# Patient Record
Sex: FEMALE | Race: WHITE | NOT HISPANIC OR LATINO | Employment: FULL TIME | URBAN - NONMETROPOLITAN AREA
[De-identification: names, ages, dates, MRNs, and addresses within clinical notes are randomized per-mention and may not be internally consistent; named-entity substitution may affect disease eponyms.]

---

## 2017-02-08 ENCOUNTER — HISTORY (OUTPATIENT)
Dept: FAMILY MEDICINE | Facility: OTHER | Age: 57
End: 2017-02-08

## 2017-02-08 ENCOUNTER — OFFICE VISIT - GICH (OUTPATIENT)
Dept: FAMILY MEDICINE | Facility: OTHER | Age: 57
End: 2017-02-08

## 2017-02-08 DIAGNOSIS — N30.00 ACUTE CYSTITIS WITHOUT HEMATURIA: ICD-10-CM

## 2017-02-08 DIAGNOSIS — Z12.39 ENCOUNTER FOR OTHER SCREENING FOR MALIGNANT NEOPLASM OF BREAST: ICD-10-CM

## 2017-02-08 DIAGNOSIS — Z23 ENCOUNTER FOR IMMUNIZATION: ICD-10-CM

## 2017-02-08 DIAGNOSIS — S39.011A STRAIN OF MUSCLE, FASCIA AND TENDON OF ABDOMEN, INITIAL ENCOUNTER: ICD-10-CM

## 2017-02-08 DIAGNOSIS — N95.2 POSTMENOPAUSAL ATROPHIC VAGINITIS: ICD-10-CM

## 2017-02-08 DIAGNOSIS — R35.0 FREQUENCY OF MICTURITION: ICD-10-CM

## 2017-02-08 DIAGNOSIS — R10.9 ABDOMINAL PAIN: ICD-10-CM

## 2017-02-08 LAB
BACTERIA URINE: ABNORMAL BACTERIA/HPF
BILIRUB UR QL: NEGATIVE
CLARITY, URINE: CLEAR CLARITY
COLOR UR: YELLOW COLOR
EPITHELIAL CELLS: ABNORMAL EPI/HPF
GLUCOSE URINE: NEGATIVE MG/DL
KETONES UR QL: NEGATIVE MG/DL
LEUKOCYTE ESTERASE URINE: ABNORMAL
NITRITE UR QL STRIP: POSITIVE
OCCULT BLOOD,URINE - HISTORICAL: ABNORMAL
PH UR: 6 [PH]
PROTEIN QUALITATIVE,URINE - HISTORICAL: 30 MG/DL
RBC - HISTORICAL: ABNORMAL /HPF
SP GR UR STRIP: 1.02
UROBILINOGEN,QUALITATIVE - HISTORICAL: NORMAL EU/DL
WBC - HISTORICAL: >100 /HPF

## 2017-02-15 ENCOUNTER — HISTORY (OUTPATIENT)
Dept: RADIOLOGY | Facility: OTHER | Age: 57
End: 2017-02-15

## 2017-02-15 ENCOUNTER — HOSPITAL ENCOUNTER (OUTPATIENT)
Dept: RADIOLOGY | Facility: OTHER | Age: 57
End: 2017-02-15
Attending: FAMILY MEDICINE

## 2017-02-15 DIAGNOSIS — Z12.39 ENCOUNTER FOR OTHER SCREENING FOR MALIGNANT NEOPLASM OF BREAST: ICD-10-CM

## 2017-03-06 ENCOUNTER — HISTORY (OUTPATIENT)
Dept: FAMILY MEDICINE | Facility: OTHER | Age: 57
End: 2017-03-06

## 2017-03-06 ENCOUNTER — OFFICE VISIT - GICH (OUTPATIENT)
Dept: FAMILY MEDICINE | Facility: OTHER | Age: 57
End: 2017-03-06

## 2017-03-06 DIAGNOSIS — Z00.00 ENCOUNTER FOR GENERAL ADULT MEDICAL EXAMINATION WITHOUT ABNORMAL FINDINGS: ICD-10-CM

## 2017-03-06 DIAGNOSIS — Z83.42 FAMILY HISTORY OF HYPERCHOLESTEROLEMIA: ICD-10-CM

## 2017-03-06 DIAGNOSIS — Z12.4 ENCOUNTER FOR SCREENING FOR MALIGNANT NEOPLASM OF CERVIX: ICD-10-CM

## 2017-03-06 DIAGNOSIS — L57.0 ACTINIC KERATOSIS: ICD-10-CM

## 2017-03-10 ENCOUNTER — COMMUNICATION - GICH (OUTPATIENT)
Dept: FAMILY MEDICINE | Facility: OTHER | Age: 57
End: 2017-03-10

## 2017-03-10 ENCOUNTER — AMBULATORY - GICH (OUTPATIENT)
Dept: FAMILY MEDICINE | Facility: OTHER | Age: 57
End: 2017-03-10

## 2017-03-10 ENCOUNTER — AMBULATORY - GICH (OUTPATIENT)
Dept: LAB | Facility: OTHER | Age: 57
End: 2017-03-10

## 2017-03-10 DIAGNOSIS — N95.2 POSTMENOPAUSAL ATROPHIC VAGINITIS: ICD-10-CM

## 2017-03-10 DIAGNOSIS — Z00.00 ENCOUNTER FOR GENERAL ADULT MEDICAL EXAMINATION WITHOUT ABNORMAL FINDINGS: ICD-10-CM

## 2017-03-10 DIAGNOSIS — Z83.42 FAMILY HISTORY OF HYPERCHOLESTEROLEMIA: ICD-10-CM

## 2017-03-10 LAB
A/G RATIO - HISTORICAL: 1.5 (ref 1–2)
ALBUMIN SERPL-MCNC: 4.3 G/DL (ref 3.5–5.7)
ALP SERPL-CCNC: 49 IU/L (ref 34–104)
ALT (SGPT) - HISTORICAL: 8 IU/L (ref 7–52)
ANION GAP - HISTORICAL: 8 (ref 5–18)
AST SERPL-CCNC: 18 IU/L (ref 13–39)
BILIRUB SERPL-MCNC: 1.9 MG/DL (ref 0.3–1)
BUN SERPL-MCNC: 18 MG/DL (ref 7–25)
BUN/CREAT RATIO - HISTORICAL: 24
CALCIUM SERPL-MCNC: 9.5 MG/DL (ref 8.6–10.3)
CHLORIDE SERPLBLD-SCNC: 103 MMOL/L (ref 98–107)
CHOL/HDL RATIO - HISTORICAL: 3.47
CHOLESTEROL TOTAL: 215 MG/DL
CO2 SERPL-SCNC: 27 MMOL/L (ref 21–31)
CREAT SERPL-MCNC: 0.75 MG/DL (ref 0.7–1.3)
GFR IF NOT AFRICAN AMERICAN - HISTORICAL: >60 ML/MIN/1.73M2
GLOBULIN - HISTORICAL: 2.9 G/DL (ref 2–3.7)
GLUCOSE SERPL-MCNC: 91 MG/DL (ref 70–105)
HDLC SERPL-MCNC: 62 MG/DL (ref 23–92)
LDLC SERPL CALC-MCNC: 141 MG/DL
NON-HDL CHOLESTEROL - HISTORICAL: 153 MG/DL
PATIENT STATUS - HISTORICAL: ABNORMAL
POTASSIUM SERPL-SCNC: 3.9 MMOL/L (ref 3.5–5.1)
PROT SERPL-MCNC: 7.2 G/DL (ref 6.4–8.9)
SODIUM SERPL-SCNC: 138 MMOL/L (ref 133–143)
TRIGL SERPL-MCNC: 60 MG/DL
TSH - HISTORICAL: 1.68 UIU/ML (ref 0.34–5.6)

## 2017-03-22 ENCOUNTER — AMBULATORY - GICH (OUTPATIENT)
Dept: FAMILY MEDICINE | Facility: OTHER | Age: 57
End: 2017-03-22

## 2017-11-02 ENCOUNTER — AMBULATORY - GICH (OUTPATIENT)
Dept: FAMILY MEDICINE | Facility: OTHER | Age: 57
End: 2017-11-02

## 2017-11-02 DIAGNOSIS — Z23 ENCOUNTER FOR IMMUNIZATION: ICD-10-CM

## 2018-01-03 NOTE — TELEPHONE ENCOUNTER
Patient Information     Patient Name MRN Sex Celio Jacobs [id number] Female 1960      Telephone Encounter by Lindy Hayes MD at 3/10/2017  2:42 PM     Author:  Lindy Hyaes MD Service:  (none) Author Type:  Physician     Filed:  3/10/2017  2:42 PM Encounter Date:  3/10/2017 Status:  Signed     :  Lindy Hayes MD (Physician)            TSH order placed.  Lindy Hayes MD ....................  3/10/2017   2:42 PM

## 2018-01-03 NOTE — PROGRESS NOTES
Patient Information     Patient Name MRN Sex Celio Gregorio [id number] Female 1960      Progress Notes by Erlinda Leonardo MD at 2017  8:30 AM     Author:  Erlinda Leonardo MD Service:  (none) Author Type:  Physician     Filed:  2017  7:08 PM Encounter Date:  2017 Status:  Signed     :  Erlinda Leonardo MD (Physician)            Nursing Notes:   Isabel Parish  2017  8:46 AM  Signed  Patient went skiing  and Monday and she fell. She is wondering if she pulled a muscle. She also has had frequent urination. She would like her flu shot today.    Isabel Parish LPN.......................... 2017  8:42 AM           Subjective:  Celio Yap is a 56 y.o. female who presents for abdominal pain / vaginal dryness/ pulled muscle.     Patient is a very pleasant 56-year-old white female she is a  in a monogamous relationship. She states that she is having increased frequency of urination. She denies any excessive thirst or urination. She declines STD screening is PELVIC exam though she does indicate that she is having pain suprapubically when she is urinating. She notes that she had recent intercourse over the weekend and symptoms started shortly after that. When asked if this is how her typical UTI start she indicates at times it does. She was also out skiing this weekend and fell when her dog stop suddenly. She landed in an unusual position straining her lower abdomen. She also has pain and discomfort in her quads. No back pain. She reports yesterday she had a hard time getting to the bathroom in time. She wasn't overtly incontinent. She denies any saddle numbness denies any incontinence of stool. No fevers or chills. She is complaining of vaginal dryness and wondering what can be done. In review of her chart she is overdue for mammogram and Pap.     She denies any upper respiratory infection symptoms.     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  Relatives in [place]. 2 grown children, 2 grandchildren                            Family Hx:   Family History       Problem   Relation Age of Onset     Other  Mother 72      of abdominal infection       Other  Father 25      in MVA       Cancer-breast  Maternal Aunt        Objective:  Visit Vitals       /80     Pulse 80     Ht 1.676 m (5' 6\")     Wt 65.8 kg (145 lb)     Breastfeeding No     BMI 23.4 kg/m2      General Appearance: Normal., Pleasant, alert, appropriate appearance for age. No acute distress,  Psych-alert, oriented, and appropriate affect  HEENT-within normal limits  Neck Exam: Normal., Supple, no masses or nodes.  Thyroid Exam: No nodules or enlargement., normal to palpation  Lungs:  Clear to auscultation.  Cardiovascular Exam: Regular rate and rhythm. S1, S2, no murmur, click, gallop, or rubs.  Breast Exam: declined   Gastrointestinal Exam: Soft, tender over bladder no abnormal masses or organomegaly.  MSK:  She has full range of motion of her back with flexion and extension. Negative straight leg lifts bilaterally. She has good internal and external rotation of her hips  Genitourinary Exam Female:offered  Patient declined.    Skin: no concerning moles, rashes, or lesions  Extremities:  NT, no edema        Results for orders placed or performed in visit on 17      URINALYSIS W REFLEX MICROSCOPIC IF POSITIVE      Result  Value Ref Range    COLOR                     Yellow Yellow Color    CLARITY                   Clear Clear Clarity    SPECIFIC " GRAVITY,URINE    1.020 1.010, 1.015, 1.020, 1.025                    PH,URINE                  6.0 6.0, 7.0, 8.0, 5.5, 6.5, 7.5, 8.5                    UROBILINOGEN,QUALITATIVE  Normal Normal EU/dl    PROTEIN, URINE 30 (A) Negative mg/dL    GLUCOSE, URINE Negative Negative mg/dL    KETONES,URINE             Negative Negative mg/dL    BILIRUBIN,URINE           Negative Negative                    OCCULT BLOOD,URINE        Large (A) Negative                    NITRITE                   Positive (A) Negative                    LEUKOCYTE ESTERASE        Small (A) Negative                   URINALYSIS MICROSCOPIC      Result  Value Ref Range    RBC 11-25 (A) 0-2, None Seen /HPF    WBC >100 (A) 0-2, 3-5, None Seen /HPF    BACTERIA                  Many (A) None Seen, Rare, Occasional, Few Bacteria/HPF    EPITHELIAL CELLS          Few None Seen, Few Epi/HPF         Assessment:    ICD-10-CM    1. Urinary frequency R35.0 URINALYSIS W REFLEX MICROSCOPIC IF POSITIVE      URINALYSIS W REFLEX MICROSCOPIC IF POSITIVE      URINALYSIS MICROSCOPIC      URINALYSIS MICROSCOPIC      phenazopyridine (PYRIDIUM) 200 mg tablet   2. Abdominal pain, unspecified location R10.9    3. Acute cystitis without hematuria N30.00 FLU VACCINE => 3 PRESERV FREE QUADRIVALENT IIV4 IM      trimethoprim-sulfamethoxazole, 160-800 mg, (BACTRIM DS, SEPTRA DS) tablet   4. Vaginal atrophy N95.2 estradiol (ESTRACE) 0.1 mg/g vaginal cream   5. Breast cancer screening Z12.39 XR MAMMO BILAT SCREENING   6. Need for influenza vaccination Z23 IA ADMIN VACC INITIAL   7. Abdominal muscle strain, initial encounter S39.011A         Reviewed at length use of hormone cream. Patient was instructed on using it every night for the first 10 days then no more than twice a week. Explained to patient increase use of unopposed estrogen can result in endometrial cancer. Any bleeding would prompt discontinuation of the hormone cream as well as endometrial biopsy.  She is able to  verbalize understanding and wishes to proceed.    Discuss UTI symptoms. Reviewed differences between cystitis versus ascending UTI versus kidney infection. Reviewed importance of voiding after intercourse. Urine culture pending    Discussed stretching exercises, use of topical capsaicin like medications. Ice. Offered muscle relaxant patient declined       I spent approximately  30 minutes with the patient (exclusive of separately billed services/procedures), with greater than 50% spent in counseling, prognosis, risks and benefits of management or follow-up.  Reviewed importance of compliance with chosen treatment options and follow-up.  Risk factor reduction and patient education and coordinating care, establishing and/or reviewing the patient's medical record also completed during today's exam .      Plan:   -- Expected clinical course discussed   -- Medications and their side effects discussed  Patient Instructions     1.   Take bactrim DS for your UTI take twice a day at least 7 days.     2. Estrace vaginal cream.   Use 1/2 to 1 applicator every night , only for the first 10 days; then no more than twice a week.     3.  Oven/ microwave    4.  aspercream with lidocaine/ salonpas for muscle aches.     5.  Hydrate !      6.  Follow up worsening or persistent symptoms    7.  Rethink flu shot;  Can obtain at any local pharmacy    8. Mammogram ordered.     Immunization History     Administered  Date(s) Administered     AMB Influenza, IIV3 (Age >=3 years)(Flu Clinic Only) 11/12/2012, 10/23/2013     Influenza, IIV4 (Age >= 3 Years) 11/05/2014     Tdap 04/04/2013          Index [place]   Bladder Infection: Brief Version   ________________________________________________________________________  KEY POINTS    The bladder is the part of your body that stores urine. When bacteria get into the bladder, it can get infected.    Your provider will give you antibiotics to treat the infection.    Drink lots of water and take your  medicine for as long as your healthcare provider prescribes, even when you feel better.  ________________________________________________________________________  What is a bladder infection?   The bladder is the part of your body that stores urine. When bacteria get into the bladder, it can get infected.  What is the cause?  A bladder infection happens when bacteria from the skin get into the bladder.    Bacteria can spread to the bladder from the rectal area or vagina.    Sometimes the bladder gets infected when something is blocking the flow of urine. For example, in men the problem can be caused by an enlarged prostate gland. In pregnant women pressure from the baby might cause the problem.  Women get bladder infections more often than men.  What are the symptoms?     You may feel the need to urinate a lot.    You may feel a burning or stinging when you urinate.    You may have cramps in your lower belly or back.    Your urine may be cloudy and smell bad.    Your urine may look pink or red.    You may have a fever or chills.  How is it treated?   If tests by your healthcare provider show that you have a bladder infection, your provider will prescribe antibiotics. You may also need pain medicine.  How can I take care of myself?     Take the antibiotic medicine for as long as your healthcare provider prescribes, even when you feel better.    Drink more water than you usually do.    Make sure you know when you should come back for a checkup.    Call your provider if your symptoms aren t better in 2 days, or if you have worse fever or pain.  How can I help prevent bladder infection?   Urinate often during the day. You should also urinate after you have sex.  If you are a woman, it is important to:     Keep the area around your vagina clean.    Wipe from front to back after you go to the bathroom.    Gently wash the area around your vagina when you bathe or shower.    Wear cotton underwear and use pantyhose with  cotton crotches.    Avoid tight clothing. Wear loose pants.    Take wet bathing suits off right away.  Talk to your healthcare provider if you have bladder infections often. You may need tests to find out why. Your provider may prescribe medicine that helps prevent bladder infections.  If you are a man, remember to:    Always wash your penis when you bathe or shower. If you are not circumcised, gently pull back the foreskin and wash the tip of the penis when you bathe.  Developed by [employer].  Adult Advisor 2016.3 published by [employer].  Last modified: 2015-04-29  Last reviewed: 2015-04-28  This content is reviewed periodically and is subject to change as new health information becomes available. The information is intended to inform and educate and is not a replacement for medical evaluation, advice, diagnosis or treatment by a healthcare professional.  References   Adult Advisor 2016.3 Index    Copyright   2016 [employer], a division of [employer] All rights reserved.          Index Related topics   Atrophic Vaginitis   ________________________________________________________________________  KEY POINTS    Atrophic vaginitis is thinning, dryness, and irritation of the vagina. It may happen when your body produces less estrogen.    If you are not pregnant or breast feeding, your healthcare provider may recommend that you take estrogen to replace the hormone your body is no longer making.    If you have been through menopause and have any vaginal bleeding, tell your healthcare provider.  ________________________________________________________________________  What is atrophic vaginitis?  Atrophic vaginitis is thinning, dryness, and irritation of the vagina. When you have less of the hormone estrogen in your body, the tissues in and around your vagina get thinner and make less moisture. This is most common after menopause.  What is the cause?  The ovaries produce the hormone estrogen as long  as you have menstrual periods. Atrophic vaginitis may happen when your body produces less estrogen, such as:    During or after menopause    While you are breast-feeding    After removal of both ovaries    After exposure of your pelvis or belly to X-rays for cancer treatment    After chemotherapy  What are the symptoms?  Symptoms may include:    Vaginal irritation, pain, or bleeding during sex    Vaginal dryness    Itching or burning of the area around the vagina    Burning or pain after you urinate    Leakage of urine    Vaginal discharge, often gray-colored with a bad odor, possibly blood-streaked  How is it diagnosed?  Your healthcare provider will ask about your symptoms and medical history and examine you. You will have a pelvic exam to check your vaginal tissue. You may have tests of vaginal discharge, urine, and blood.  How is it treated?  If you are not pregnant or breast feeding, your healthcare provider may recommend that you take estrogen to replace the hormone your body is no longer making. This treatment is called estrogen therapy, hormone replacement therapy (HRT), or menopausal hormone therapy (MHT). Discuss the risks and benefits of hormone therapy with your healthcare provider. Hormone therapy may increase your risk for heart disease. It may also increase your risk for stroke, breast cancer, blood clots, gallbladder problems, and possibly dementia. If you still have your uterus and choose to take hormones, you will need to take progesterone with the estrogen. Taking estrogen alone may increase your risk of cancer of the uterus.  Estrogen may be taken in many different forms, such as:    Tablets to be swallowed    Patches or lotion to be put on the skin    A cream, ring, or tablet put into the vagina    Pellets placed under the skin    Shots  If you are going to take hormone therapy, ask your healthcare provider about:    The different types and dosages of hormone therapy    Any side effects or  special precautions you should know about    When you should start and stop taking the hormones  Without hormone therapy, you may keep having the symptoms. If you use a hormone cream, you may have more normal vaginal moisture and secretions in about 2 to 4 weeks. With other forms of hormone therapy, such as pills, it may take 4 to 8 weeks.  How can I take care of myself and prevent the symptoms of atrophic vaginitis?   To help reduce or prevent symptoms of atrophic vaginitis:    Use a lubricant if you have mild pain during sex. Glycerin or water-based vaginal lubricants, such as K-Y jelly, can help lessen pain during sex. (Petroleum jelly, like Vaseline, is not recommended.) Regular sexual activity, including masturbation or intercourse, can also help keep the vaginal tissue healthy.    Avoid chemical irritants such as douches, sprays, and bubble bath.    When you bathe, use a mild soap in the vaginal area.    Wear loose-fitting, all-cotton underwear or cotton-crotch underwear. Change your underwear every day. Don t wear underwear when you sleep at night. This will help lessen your chances of getting vaginal infections, which could cause more discomfort.    If you have itching, avoid wearing pantyhose until the itching stops.    If you are still having periods, use unscented sanitary pads instead of tampons.    Don t use spermicidal foams, gels, or creams if they irritate your vagina. However, if you have not stopped having periods, make sure you practice good birth control if you want to prevent pregnancy.    If you have been through menopause and have any vaginal bleeding, tell your healthcare provider.  Developed by [employer].  Adult Advisor 2016.3 published by [employer].  Last modified: 2016-05-25  Last reviewed: 2014-12-11  This content is reviewed periodically and is subject to change as new health information becomes available. The information is intended to inform and educate and is not a replacement for  medical evaluation, advice, diagnosis or treatment by a healthcare professional.  References   Adult Advisor 2016.3 Index    Copyright   2016 [employer], a division of [employer] All rights reserved.            Index [place]   Estradiol, Vaginal   [id number]  ________________________________________________________________________  KEY POINTS    This medicine is put into the vagina to relieve the symptoms of menopause. Use it exactly as directed.    You should use hormones only when you need them, at the lowest effective dosage, and only for as long as you need them. You need regular checkups and exams while taking this medicine. Keep all appointments.    This medicine increases the risk of heart disease, stroke, breast cancer, endometrial cancer, blood clots in the lungs and legs, gallbladder disease, and possibly dementia. It may cause other unwanted side effects. Tell your healthcare provider if you have any side effects that are serious, continue, or get worse.    Tell all healthcare providers who treat you about all the prescription medicines, nonprescription medicines, supplements, natural remedies, and vitamins that you take.  ________________________________________________________________________  What are other names for this medicine?   Type of medicine: hormone  Generic and brand names: estradiol, vaginal; Estrace Vaginal Cream  What is this medicine used for?  This medicine is inserted in the vagina to treat dryness, itching, and burning, in and around the vagina due to menopause (when a woman no longer has regular monthly periods).  This medicine may be used to treat other conditions as determined by your healthcare provider.  What should my healthcare provider know before I take this medicine?   Tell your healthcare provider if you have ever had:    An allergic reaction to any hormones or medicines    A bleeding disorder    A hysterectomy (removal of the uterus)    A stroke or  transient ischemic attack (TIA)    Asthma    Blood clots in your legs, lungs, brain, or eyes    Cancer of the breast, uterus, cervix, or vagina    Depression    Diabetes    Endometriosis    Heart disease or a heart attack    Hereditary angioedema    High blood pressure    High cholesterol    Liver, gallbladder, or kidney disease    Lupus    Migraines or headaches along with vomiting, double vision, unsteadiness, weakness, or personality changes    Porphyria (nerve pain or sensitivity to sunlight)    Problems with calcium levels in the blood    Seizures    Unexplained vaginal bleeding    Yellowing of the eyes or skin (jaundice) during pregnancy or during past use of hormonal birth control  Tell your healthcare provider if you have recently had a long period of bed rest after major surgery or a broken bone in a cast.  Tell your provider if you have a family history of heart disease, heart attack, blood clots, strokes, breast cancer, or other conditions.  Tell your healthcare provider if you smoke. Smoking while you are using this medicine increases the risk of serious side effects such as heart attack, stroke, and blood clots. The risk increases with age and the number of cigarettes smoked a day. Talk to your healthcare provider about ways to quit smoking.  Females of childbearing age: Do not take this medicine if you are pregnant. This medicine has been reported to cause birth defects. Stop taking this medicine at the first sign that you may be pregnant and contact your healthcare provider right away. Do not breast-feed while taking this medicine without your healthcare provider's approval.  How do I use it?   Check the label on the medicine for directions about your specific dose. Read the information sheet that comes in the medicine package. Follow these directions carefully. If you do not understand how to use this medicine, ask your healthcare provider or pharmacist to explain.  Use this medicine exactly as your  healthcare provider prescribes. Do not use more of it or use it longer than prescribed. If you do not understand how to use this medicine, ask your healthcare provider or pharmacist to explain.  This medicine is intended for use only after menopause. Check with your healthcare provider before using this medicine in children under age 18.  Wash your hands before and after using this medicine. The medicine is in a tube. Take the cap off the tube and screw the applicator onto the tube. Squeeze the medicine into the applicator slowly until the applicator reaches the dose measurement that your provider prescribed. Remove the applicator from the tube. Put the cap back on the tube.  Use the applicator to put the correct amount of medicine into your vagina. Lie on your back with your knees bent. Hold the full applicator in one hand. Put the applicator into your vagina (like you would a tampon) as far as you can. Slowly press the plunger until it stops. Withdraw the applicator. The medicine will be left behind in the vagina.  Wash the applicator with soap and lukewarm water after every use. Rinse and dry it well.  While using this medicine, wear a sanitary napkin to protect your clothing.  What if I miss a dose?  If you are not sure of what to do if you miss a dose, contact your healthcare provider.  What if I overdose?  An acute overdose of this medicine is not likely to cause life-threatening symptoms. If you think that you or anyone else may have taken too much of this medicine, call the poison control center at [phone].  What should I watch out for?   Hormone therapy, also known as hormone replacement therapy (HRT) offers both benefits and risks. Estrogen has previously been prescribed to help prevent bone loss (osteoporosis). This is no longer advised because it may be harmful. Estrogen also increases the risk of stroke, blood clots in the lungs and legs, heart disease, breast cancer, and possibly dementia. Also,  estrogen taken without progestin increases the risk of uterine cancer if you still have your uterus.  Most experts now recommend short-term use (up to 3 or 4 years) if hormone therapy is needed to relieve menopausal symptoms and prevent bone loss. You should take hormones only when you need them, at the lowest effective dosage, and only for as long as you need them. The risk of medical problems such as stroke or cancer increases further with higher doses and longer treatments. Talk with your healthcare provider about this.  You need to see your provider regularly (every 6 months) for checkups to find out if this medicine is still needed and if you are having any side effects. Keep all your appointments. Do not take this medicine for longer than 1 year without a complete physical exam.  You should also have a breast exam and mammogram every year or as often as your healthcare provider advises.  If you need emergency care, surgery, lab tests, or dental work, tell the healthcare provider or dentist you are using this medicine. You may need to stop taking hormones at least 4 to 6 weeks before the surgery and bedrest.  Tell healthcare providers you are taking this medicine before you have lab tests. This medicine may change blood test results such as cholesterol, liver function, and thyroid levels.  Sometimes this medicine causes dark spots to develop on your skin, especially with long exposure to the sun. The spots usually go away when you stop using this medicine. While you are taking this medicine, avoid long exposure to the sun. Wear protective clothing, a hat, and sunscreen lotion when you need to be outdoors. Do not use a sunlamp.  This medicine may weaken condoms, diaphragms, and cervical caps. Talk with your healthcare provider about this.  If you wear contact lenses and notice a change in your vision or it becomes difficult to wear your lenses, contact your healthcare provider.  This medicine may increase your  HDL cholesterol levels, decrease your LDL levels, and increase your blood triglyceride levels. Talk to your healthcare provider about this.  If you have diabetes: This medicine may affect your blood sugar level and change the amount of insulin or other diabetes medicines you may need. Talk to your healthcare provider about this.  What are the possible side effects?   Along with its needed effects, your medicine may cause some unwanted side effects. Some side effects may be very serious. Some side effects may go away as your body adjusts to the medicine. Tell your healthcare provider if you have any side effects that continue or get worse.  Life-threatening (Report these to your healthcare provider right away. If you are unable to reach your healthcare provider right away, get emergency medical care or call 911 for help.): Allergic reaction (hives; itching; rash; trouble breathing; swelling of your lips, tongue and throat); sudden weakness, numbness, or tingling, especially on one side of your body; sudden or severe headache; sudden trouble with vision, speech, balance, or walking; crushing chest pain or chest heaviness; coughing up blood; sudden shortness of breath; swelling.  Serious (Report these to your healthcare provider right away.): Unexplained bleeding from your vagina; dizziness, or fainting; breast lumps; redness, warmth, or swelling in your hands, ankles or feet; increased blood pressure; discomfort from contact lenses, vision changes; yellowing of the skin or eyes, especially with fever, tiredness, loss of appetite, dark urine, light-colored bowel movements.  Other: Changes in menstruation, breast tenderness, nausea, vomiting, acne, hair loss, cramping, minor bloating, depression, anxiety, weight gain or loss, headache, sensitivity to the sun; darkening of skin on the face; vaginal itch.  What products might interact with this medicine?   When you take this medicine with other medicines, it can change  the way this or any of the other medicines work. Nonprescription medicines, vitamins, natural remedies, and certain foods may also interact. Using these products together might cause harmful side effects. Talk to your healthcare provider if you are taking:    Antibiotics such as ciprofloxacin (Cipro), clarithromycin (Biaxin), erythromycin (E.E.S., Dany-Tab, Erythrocin), rifabutin (Mycobutin), rifampin (Rifadin), and rifapentine (Priftin)    Antidepressants such as amitriptyline, clomipramine, imipramine (Tofranil), and nortriptyline (Pamelor)    Antifungal medicines such as fluconazole (Diflucan), itraconazole (Sporanox), ketoconazole (Nizoral), posaconazole (Noxafil), and voriconazole (Vfend)    Antiseizure medicines such as carbamazepine (Carbatrol, Epitol, Equetro, Tegretol), ethotoin (Peganone), felbamate (Felbatol), fosphenytoin (Cerebyx), gabapentin (Neurontin), lamotrigine (Lamictal), levetiracetam (Keppra), oxcarbazepine (Trileptal), phenytoin (Dilantin, Phenytek), primidone (Mysoline), tiagabine (Gabitril), topiramate (Qudexy, Topamax, Trokendi), and valproic acid (Depacon, Depakene, Depakote)    Aprepitant (Emend) and fosaprepitant (Emend)    Barbiturates such as butabarbital (Butisol), pentobarbital (Nembutal), phenobarbital, and secobarbital (Seconal)    Bosentan (Tracleer)    Bupropion (Aplenzin, Forfivo, Wellbutrin, Buproban, Zyban)    Calcium channel blockers such as diltiazem (Cardizem, Cartia, Tiazac) and verapamil (Calan, Covera, Verelan)    Cancer medicines such as anastrozole (Arimidex), exemestane (Aromasin), letrozole (Femara), and tamoxifen    Cimetidine (Tagamet)    Corticosteroids such as betamethasone, cortisone, dexamethasone, fludrocortisone, hydrocortisone (A-Hydrocort, Cortef), methylprednisolone (Medrol, Solu-Medrol), prednisolone (Omnipred, Orapred, Prelone), prednisone (Prednisone Intensol), and triamcinolone (Aristospan, Kenalog)    Cyclosporine (Gengraf, Neoral,  Sandimmune)    Dantrolene (Dantrium)    Diabetes medicines such as acarbose (Precose), glipizide (Glucotrol), glyburide (DiaBeta, Glynase), insulin, metformin (Fortamet, Glucophage, Riomet), miglitol (Glyset), nateglinide (Starlix), pioglitazone (Actos), repaglinide (Prandin), and rosiglitazone (Avandia)    Doxepin (Silenor)    HIV medicines such as atazanavir (Reyataz), darunavir (Prezista), delavirdine (Rescriptor), efavirenz (Sustiva), etravirine (Intelence), fosamprenavir (Lexiva), indinavir (Crixivan), lopinavir/ritonavir (Kaletra), nelfinavir (Viracept), nevirapine (Viramune), ritonavir (Norvir), saquinavir (Invirase), stavudine (Zerit), and tipranavir (Aptivus)    Isoniazid    Isotretinoin (Absorica, Amnesteem, Claravis, Myorisan, Zenatane)    Methotrexate (Otrexup, Rasuvo, Rheumatrex, Trexall)    Natural remedies such as alfalfa, black cohosh, bloodroot, chasteberry, dong quai, evening primrose oil, ginseng, licorice, red clover, Ardencroft's wort, saw palmetto, soy, topical progesterone, and wild yam    Raloxifene (Evista)    Theophylline    Thyroid medicines such as levothyroxine (Levo-T, Levothroid, Levoxyl, Synthroid, Unithroid), liothyronine (Cytomel, Triostat), liotrix (Thyrolar), and thyroid USP ([place] Thyroid, Nature-Throid)    Tizanidine (Zanaflex)    Warfarin (Coumadin)  If you are not sure if your medicines might interact, ask your pharmacist or healthcare provider. Keep a list of all your medicines with you. List all the prescription medicines, nonprescription medicines, supplements, natural remedies, and vitamins that you take. Be sure that you tell all healthcare providers who treat you about all the products you are taking.   How should I store this medicine?   Store this medicine at room temperature. Keep the container tightly closed. Protect it from heat, high humidity, and bright light.    This advisory includes selected information only and may not include all side effects of this medicine  or interactions with other medicines. Ask your healthcare provider or pharmacist for more information or if you have any questions.  Ask your pharmacist for the best way to dispose of outdated medicine or medicine you have not used. Do not throw medicine in the trash.  Keep all medicines out of the reach of children.   Do not share medicines with other people.   Developed by [employer].  Medication Advisor 2016.3 published by [employer].  Last modified: 2016-03-23  Last reviewed: 2015-06-30  This content is reviewed periodically and is subject to change as new health information becomes available. The information is intended to inform and educate and is not a replacement for medical evaluation, advice, diagnosis or treatment by a healthcare professional.  References   Medication Advisor 2016.3 Index    Copyright   2016 [employer], a division of [employer] All rights reserved.         Shoutly: Have You Had Your Mammogram?     Mammograms  Why are mammograms important?            Mammograms can help women detect breast cancer early.            A mammogram may find the smallest cancers before a lump can be felt.            Early detection means more choices for treatment if you do have cancer.  Reasons Women Give for Avoiding a Mammogram  Do you or someone you know say the following about mammograms?   There s no breast cancer in my family.    I don t have time.    It might hurt.   There are many reasons why women say they do not get regular mammograms. The following are some of their reasons along with some important information for you to know.             I can t afford a mammogram.   Most insurance companies cover the cost of the screening (you may have a copay).     Check with your insurance provider to see if the mammogram(s) will be covered. Your health care provider s recommendation for services does not guarantee coverage by your insurance provider.    Medicare now covers yearly  mammograms. Free and low-cost mammograms are available for women with little or no insurance. Call the American Cancer Society at 1-718.352.2660 for more information about these programs.               No one in my family has ever had breast cancer. I don t need screening.   Being a woman and getting older are the two greatest risk factors for getting breast cancer. The majority of all breast cancers occur in women with no other risk factors.    Breast cancer risk is increased if other close members of your family (mother or sister) had breast cancer.    You have a slightly increased risk of breast cancer if you:  -       had your first period before age 12  -       have never given birth  -       gave birth to your first child after age 35  -       started menopause after age 55.  Obesity is linked to an increased risk of breast cancer.     Please talk with your health care provider if you have any questions or concerns about these risk factors.                My health care provider never told me to get a mammogram.   If your health care provider hasn t told you about the need for a mammogram, ask him or her.                I don t have time to get a mammogram.   The entire test usually takes less than 30 minutes. It s important to take the time to care for yourself and your health.                I heard it hurts. I don t want to go through that.   You may feel some brief discomfort, but it lasts less than 30 seconds. A technologist will position your breast and compress it to get a good  picture.      If you have sensitive breasts, try having your mammogram at a time of the month when your breasts will be least tender. For example, avoid scheduling a mammogram the week before your period. This will help lessen discomfort.                I m better off not knowing.   More than 80 percent of breast lumps are not cancerous. You can gain peace of mind from taking control of your health and having regular  mammograms.     You Can See the Difference a Mammogram Makes    Regular mammograms are your best way to find tumors at their smallest when treatment can be the most successful.  Breast Cancer Screening Schedule  When to start having mammograms to screen for breast cancer, and how often to have them, is a personal decision. It should be based on your preferences, your values and your risk for developing breast cancer. [employer] recommends that you and your health care provider together determine when mammograms are right for you.  [employer] recommends the following guidelines for women who have an average risk for breast cancer, based on American Cancer Society guidelines:            Age 40 to 44: Mammograms are optional.             Age 45 to 54: Have a mammogram every year.            Age 55 and older: Have a mammogram every year, or transition to having one every 2 years. Continue to have mammograms as long as your health is good.  If you have a  higher than average risk for breast cancer, your health care provider may recommend a different schedule.  Talk with your health care provider about your risk level. Together, you and your health care provider can decide what screening schedule is right for you.   2016 [employer]. TM - A TRADEMARK OF [employer]  OTHER TRADEMARKS USED ARE OWNED BY THEIR RESPECTIVE OWNERS  THIS FACT SHEET DOES NOT REPLACE MEDICAL OR PROFESSIONAL ADVICE; IT IS ONLY A GUIDE  [id number] (12/16)        Electronically signed by Erlinda Leonardo MD

## 2018-01-03 NOTE — TELEPHONE ENCOUNTER
Patient Information     Patient Name MRN Sex Celio Jacobs [id number] Female 1960      Telephone Encounter by Jackie Madsen at 3/10/2017 12:45 PM     Author:  Jackie Madsen Service:  (none) Author Type:  (none)     Filed:  3/10/2017 12:46 PM Encounter Date:  3/10/2017 Status:  Signed     :  Jackie Madsen            Patient was in for lab appointment today. She thought she was supposed to have a TSH done with the CMP, and Lipid already ordered. If so please place an order for TSH and we can add it on.

## 2018-01-03 NOTE — NURSING NOTE
Patient Information     Patient Name MRN Celio Adan [id number] Female 1960      Nursing Note by Rika Her at 3/6/2017  2:15 PM     Author:  Rika Her Service:  (none) Author Type:  (none)     Filed:  3/6/2017  2:48 PM Encounter Date:  3/6/2017 Status:  Signed     :  Rika Her            Patient is here for annual physical, has a couple skin questions, and concerns of chest pain. Pain was Saturday, was riding in a car, had jaw pain, shoulder and neck pain at time.  Rika Her LPN .............3/6/2017  2:23 PM

## 2018-01-03 NOTE — PROGRESS NOTES
Patient Information     Patient Name MRN Sex     Celio Yap [id number] Female 1960      Progress Notes by Sasha Mcconnell MD at 3/6/2017  2:15 PM     Author:  Sasha Mcconnell MD Service:  (none) Author Type:  Physician     Filed:  3/9/2017  8:50 AM Encounter Date:  3/6/2017 Status:  Signed     :  Sasha Mcconnell MD (Physician)            ANNUAL EXAM ADULT FEMALE - GYN    Celio Yap is a 56 y.o. female, G 2, P 2, here today for her annual gynecologic exam.  HPI:  Presents for annual exam. She has a few skin lesion she would like me to look at.  She is struggling with vaginal dryness, it is limiting her ability to enjoy intercourse, decreased libido since going through menopause.No abnormal pap smears, no bleeding      CURRENT MEDICATIONS:  Current Outpatient Prescriptions       Medication  Sig Dispense Refill     estradiol (ESTRACE) 0.1 mg/g vaginal cream 1/2-1 applicator every night for first ten nights then no more than twice a week. 1 Tube 0     No current facility-administered medications for this visit.      Medications have been reviewed by me and are current to the best of my knowledge and ability.      ALLERGIES:  Review of patient's allergies indicates no known allergies.     Past Medical History      Diagnosis   Date     Hx of pregnancy       ,  X2      Postmenopausal         Past Surgical History       Procedure   Laterality Date     Endometrial ablation         Endometrial ablation       Vein stripping        Varicose vein laser procedures       Xr breast stereotactic wire local (ia)        Lumbar diskectomy          SOCIAL HISTORY:  Social History     Social History        Marital status:       Spouse name: N/A     Number of children:  N/A     Years of education:  N/A     Occupational History      Not on file.     Social History Main Topics         Smoking status:   Never Smoker     Smokeless tobacco:   Never Used     Alcohol use   0.0  "oz/week     0 Standard drinks or equivalent per week        Comment: one glass of wine per lucila      Drug use:   No     Sexual activity:   Yes     Other Topics  Concern      Service Yes     Blood Transfusions No     Caffeine Concern No     Occupational Exposure No     Hobby Hazards No     Sleep Concern No     Stress Concern No     Weight Concern No     Special Diet No     Back Care No     Exercise No     Bike Helmet No     Seat Belt Yes     Self-Exams Yes     Social History Narrative     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  Relatives in [place]. 2 grown children, 2 grandchildren                           Family History       Problem   Relation Age of Onset     Other  Mother 72      of abdominal infection       Other  Father 25      in MVA       Cancer-breast  Maternal Aunt        RISK FACTORS  Social History       Substance Use Topics         Smoking status:   Never Smoker     Smokeless tobacco:   Never Used     Alcohol use   0.0 oz/week     0 Standard drinks or equivalent per week        Comment: one glass of wine per lucila       Exercise Times/wk: 3  Seat Belt Use: 100%  Birth Control Method: none  High Risk/Behavior: none    PREVENTIVE SERVICES  Preventive services were reviewed today, 2017.    LMP: No LMP recorded. Patient is postmenopausal.  Menstrual Regularity: not applicable  Flow: not applicable    ROS  Negative for Review of Systems not covered in the HPI.    PHYSICAL EXAM  /66  Pulse 78  Ht 1.664 m (5' 5.5\")  Wt 67.1 kg (148 lb)  BMI 24.25 kg/m2  General Appearance:  Alert, appropriate appearance for age. No acute distress  HEENT Exam:  Grossly normal.  Neck / Thyroid Exam:  Supple, no masses, nodes or enlargement.  Chest/Respiratory Exam: Normal chest wall and respirations. Clear to auscultation.  Breast Exam: No dimpling, nipple retraction or discharge. No masses or nodes.  Cardiovascular Exam: Regular rate and rhythm. S1, " S2, no murmur, click, gallop, or rubs.  Gastrointestinal Exam: Soft, non-tender, no masses or organomegaly.  Pelvic Exam Female: Vulva and vagina appear normal. Bimanual exam reveals normal uterus and adnexa. Clinical staff offered to be present for exam: .   Lymphatic Exam: Non-palpable nodes in neck, clavicular, axillary, or inguinal regions.  Musculoskeletal Exam: Back is straight and non-tender, full ROM of upper and lower extremities.  Skin: 1 small AK on left flank, treated with liquid nitrogen  Neurologic Exam: Normal gait and speech, no tremor.  Psychiatric Exam: Alert and oriented, appropriate affect.  Clinical staff offered to be present for exam:   PHQ Depression Screening 3/6/2017   Date of PHQ exam (doc flow) 3/6/2017   1. Lack of interest/pleasure 0 - Not at all   2. Feeling down/depressed 0 - Not at all   PHQ-2 TOTAL SCORE 0       ASSESSMENT/PLAN    ICD-10-CM    1. Annual physical exam Z00.00    2. Family history of high cholesterol Z83.42 LIPID PANEL      COMP METABOLIC PANEL   3. Cervical cancer screening Z12.4 GYN THIN PREP PAP SCREEN IMAGED      GYN THIN PREP PAP SCREEN IMAGED   4. AK (actinic keratosis) L57.0 WA DESTROY PREMALIGNANT 1ST LESION     Ms. Quintanilla Body mass index is 24.25 kg/(m^2). This is within the normal range for a 56 y.o. Normal range for ages 18-64 is between 18.5 and 24.9; normal range for ages 65+ is 23-30.   BP Readings from Last 1 Encounters:03/06/17 : 114/66  Ms. Quintanilla blood pressure is within the normal range for adults. Per JNC-8 guidelines normal adult blood pressure is < 120/80, pre-hypertensive is between 120/80 and 139/89, and hypertension is 140/90 or greater.  Patient is counseled on importance of regular self breast exams, annual mammogram starting at the age of 40. Patient may go to every 3 years for screening Pap smears, should continue annual pelvic exams. Discussed the importance of calcium and vitamin D supplementation and screening for osteoporosis.  Immunizations are current. Pap smear was performed today and patient will receive a letter with results. Reviewed the importance of sunscreen, seatbelt and helmet use.  Patient Instructions   Pap every 3-5 years  Annual Mammograms  Start vaginal estrogen nightly x 2 weeks then 2-3 times per week  Schedule fasting lab appointment  Monitor lesion on lip for change in size, bleeding      Sasha Cole MD  8:50 AM 3/9/2017

## 2018-01-03 NOTE — PATIENT INSTRUCTIONS
Patient Information     Patient Name MRN Celio Adan [id number] Female 1960      Patient Instructions by Erlinda Leonardo MD at 2017  9:20 AM     Author:  Erlinda Leonardo MD  Service:  (none) Author Type:  Physician     Filed:  2017  7:08 PM  Encounter Date:  2017 Status:  Addendum     :  Erlinda Leonardo MD (Physician)        Related Notes: Original Note by Erlidna Leonardo MD (Physician) filed at 2017  9:21 AM            1.   Take bactrim DS for your UTI take twice a day at least 7 days.     2. Estrace vaginal cream.   Use 1/2 to 1 applicator every night , only for the first 10 days; then no more than twice a week.     3.  Oven/ microwave    4.  aspercream with lidocaine/ salonpas for muscle aches.     5.  Hydrate !      6.  Follow up worsening or persistent symptoms    7.  Rethink flu shot;  Can obtain at any local pharmacy    8. Mammogram ordered.     Immunization History     Administered  Date(s) Administered     AMB Influenza, IIV3 (Age >=3 years)(Flu Clinic Only) 2012, 10/23/2013     Influenza, IIV4 (Age >= 3 Years) 2014     Tdap 2013          Index [place]   Bladder Infection: Brief Version   ________________________________________________________________________  KEY POINTS    The bladder is the part of your body that stores urine. When bacteria get into the bladder, it can get infected.    Your provider will give you antibiotics to treat the infection.    Drink lots of water and take your medicine for as long as your healthcare provider prescribes, even when you feel better.  ________________________________________________________________________  What is a bladder infection?   The bladder is the part of your body that stores urine. When bacteria get into the bladder, it can get infected.  What is the cause?  A bladder infection happens when bacteria from the skin get into the bladder.    Bacteria can spread  to the bladder from the rectal area or vagina.    Sometimes the bladder gets infected when something is blocking the flow of urine. For example, in men the problem can be caused by an enlarged prostate gland. In pregnant women pressure from the baby might cause the problem.  Women get bladder infections more often than men.  What are the symptoms?     You may feel the need to urinate a lot.    You may feel a burning or stinging when you urinate.    You may have cramps in your lower belly or back.    Your urine may be cloudy and smell bad.    Your urine may look pink or red.    You may have a fever or chills.  How is it treated?   If tests by your healthcare provider show that you have a bladder infection, your provider will prescribe antibiotics. You may also need pain medicine.  How can I take care of myself?     Take the antibiotic medicine for as long as your healthcare provider prescribes, even when you feel better.    Drink more water than you usually do.    Make sure you know when you should come back for a checkup.    Call your provider if your symptoms aren t better in 2 days, or if you have worse fever or pain.  How can I help prevent bladder infection?   Urinate often during the day. You should also urinate after you have sex.  If you are a woman, it is important to:     Keep the area around your vagina clean.    Wipe from front to back after you go to the bathroom.    Gently wash the area around your vagina when you bathe or shower.    Wear cotton underwear and use pantyhose with cotton crotches.    Avoid tight clothing. Wear loose pants.    Take wet bathing suits off right away.  Talk to your healthcare provider if you have bladder infections often. You may need tests to find out why. Your provider may prescribe medicine that helps prevent bladder infections.  If you are a man, remember to:    Always wash your penis when you bathe or shower. If you are not circumcised, gently pull back the foreskin and  wash the tip of the penis when you bathe.  Developed by [employer].  Adult Advisor 2016.3 published by [employer].  Last modified: 2015-04-29  Last reviewed: 2015-04-28  This content is reviewed periodically and is subject to change as new health information becomes available. The information is intended to inform and educate and is not a replacement for medical evaluation, advice, diagnosis or treatment by a healthcare professional.  References   Adult Advisor 2016.3 Index    Copyright   2016 [employer], a division of [employer] All rights reserved.          Index Related topics   Atrophic Vaginitis   ________________________________________________________________________  KEY POINTS    Atrophic vaginitis is thinning, dryness, and irritation of the vagina. It may happen when your body produces less estrogen.    If you are not pregnant or breast feeding, your healthcare provider may recommend that you take estrogen to replace the hormone your body is no longer making.    If you have been through menopause and have any vaginal bleeding, tell your healthcare provider.  ________________________________________________________________________  What is atrophic vaginitis?  Atrophic vaginitis is thinning, dryness, and irritation of the vagina. When you have less of the hormone estrogen in your body, the tissues in and around your vagina get thinner and make less moisture. This is most common after menopause.  What is the cause?  The ovaries produce the hormone estrogen as long as you have menstrual periods. Atrophic vaginitis may happen when your body produces less estrogen, such as:    During or after menopause    While you are breast-feeding    After removal of both ovaries    After exposure of your pelvis or belly to X-rays for cancer treatment    After chemotherapy  What are the symptoms?  Symptoms may include:    Vaginal irritation, pain, or bleeding during sex    Vaginal dryness    Itching or  burning of the area around the vagina    Burning or pain after you urinate    Leakage of urine    Vaginal discharge, often gray-colored with a bad odor, possibly blood-streaked  How is it diagnosed?  Your healthcare provider will ask about your symptoms and medical history and examine you. You will have a pelvic exam to check your vaginal tissue. You may have tests of vaginal discharge, urine, and blood.  How is it treated?  If you are not pregnant or breast feeding, your healthcare provider may recommend that you take estrogen to replace the hormone your body is no longer making. This treatment is called estrogen therapy, hormone replacement therapy (HRT), or menopausal hormone therapy (MHT). Discuss the risks and benefits of hormone therapy with your healthcare provider. Hormone therapy may increase your risk for heart disease. It may also increase your risk for stroke, breast cancer, blood clots, gallbladder problems, and possibly dementia. If you still have your uterus and choose to take hormones, you will need to take progesterone with the estrogen. Taking estrogen alone may increase your risk of cancer of the uterus.  Estrogen may be taken in many different forms, such as:    Tablets to be swallowed    Patches or lotion to be put on the skin    A cream, ring, or tablet put into the vagina    Pellets placed under the skin    Shots  If you are going to take hormone therapy, ask your healthcare provider about:    The different types and dosages of hormone therapy    Any side effects or special precautions you should know about    When you should start and stop taking the hormones  Without hormone therapy, you may keep having the symptoms. If you use a hormone cream, you may have more normal vaginal moisture and secretions in about 2 to 4 weeks. With other forms of hormone therapy, such as pills, it may take 4 to 8 weeks.  How can I take care of myself and prevent the symptoms of atrophic vaginitis?   To help  reduce or prevent symptoms of atrophic vaginitis:    Use a lubricant if you have mild pain during sex. Glycerin or water-based vaginal lubricants, such as K-Y jelly, can help lessen pain during sex. (Petroleum jelly, like Vaseline, is not recommended.) Regular sexual activity, including masturbation or intercourse, can also help keep the vaginal tissue healthy.    Avoid chemical irritants such as douches, sprays, and bubble bath.    When you bathe, use a mild soap in the vaginal area.    Wear loose-fitting, all-cotton underwear or cotton-crotch underwear. Change your underwear every day. Don t wear underwear when you sleep at night. This will help lessen your chances of getting vaginal infections, which could cause more discomfort.    If you have itching, avoid wearing pantyhose until the itching stops.    If you are still having periods, use unscented sanitary pads instead of tampons.    Don t use spermicidal foams, gels, or creams if they irritate your vagina. However, if you have not stopped having periods, make sure you practice good birth control if you want to prevent pregnancy.    If you have been through menopause and have any vaginal bleeding, tell your healthcare provider.  Developed by [employer].  Adult Advisor 2016.3 published by [employer].  Last modified: 2016-05-25  Last reviewed: 2014-12-11  This content is reviewed periodically and is subject to change as new health information becomes available. The information is intended to inform and educate and is not a replacement for medical evaluation, advice, diagnosis or treatment by a healthcare professional.  References   Adult Advisor 2016.3 Index    Copyright   2016 [employer], a division of [employer] All rights reserved.            Index [place]i   Estradiol, Vaginal   [id number]  ________________________________________________________________________  KEY POINTS    This medicine is put into the vagina to relieve the symptoms  of menopause. Use it exactly as directed.    You should use hormones only when you need them, at the lowest effective dosage, and only for as long as you need them. You need regular checkups and exams while taking this medicine. Keep all appointments.    This medicine increases the risk of heart disease, stroke, breast cancer, endometrial cancer, blood clots in the lungs and legs, gallbladder disease, and possibly dementia. It may cause other unwanted side effects. Tell your healthcare provider if you have any side effects that are serious, continue, or get worse.    Tell all healthcare providers who treat you about all the prescription medicines, nonprescription medicines, supplements, natural remedies, and vitamins that you take.  ________________________________________________________________________  What are other names for this medicine?   Type of medicine: hormone  Generic and brand names: estradiol, vaginal; Estrace Vaginal Cream  What is this medicine used for?  This medicine is inserted in the vagina to treat dryness, itching, and burning, in and around the vagina due to menopause (when a woman no longer has regular monthly periods).  This medicine may be used to treat other conditions as determined by your healthcare provider.  What should my healthcare provider know before I take this medicine?   Tell your healthcare provider if you have ever had:    An allergic reaction to any hormones or medicines    A bleeding disorder    A hysterectomy (removal of the uterus)    A stroke or transient ischemic attack (TIA)    Asthma    Blood clots in your legs, lungs, brain, or eyes    Cancer of the breast, uterus, cervix, or vagina    Depression    Diabetes    Endometriosis    Heart disease or a heart attack    Hereditary angioedema    High blood pressure    High cholesterol    Liver, gallbladder, or kidney disease    Lupus    Migraines or headaches along with vomiting, double vision, unsteadiness, weakness, or  personality changes    Porphyria (nerve pain or sensitivity to sunlight)    Problems with calcium levels in the blood    Seizures    Unexplained vaginal bleeding    Yellowing of the eyes or skin (jaundice) during pregnancy or during past use of hormonal birth control  Tell your healthcare provider if you have recently had a long period of bed rest after major surgery or a broken bone in a cast.  Tell your provider if you have a family history of heart disease, heart attack, blood clots, strokes, breast cancer, or other conditions.  Tell your healthcare provider if you smoke. Smoking while you are using this medicine increases the risk of serious side effects such as heart attack, stroke, and blood clots. The risk increases with age and the number of cigarettes smoked a day. Talk to your healthcare provider about ways to quit smoking.  Females of childbearing age: Do not take this medicine if you are pregnant. This medicine has been reported to cause birth defects. Stop taking this medicine at the first sign that you may be pregnant and contact your healthcare provider right away. Do not breast-feed while taking this medicine without your healthcare provider's approval.  How do I use it?   Check the label on the medicine for directions about your specific dose. Read the information sheet that comes in the medicine package. Follow these directions carefully. If you do not understand how to use this medicine, ask your healthcare provider or pharmacist to explain.  Use this medicine exactly as your healthcare provider prescribes. Do not use more of it or use it longer than prescribed. If you do not understand how to use this medicine, ask your healthcare provider or pharmacist to explain.  This medicine is intended for use only after menopause. Check with your healthcare provider before using this medicine in children under age 18.  Wash your hands before and after using this medicine. The medicine is in a tube. Take  the cap off the tube and screw the applicator onto the tube. Squeeze the medicine into the applicator slowly until the applicator reaches the dose measurement that your provider prescribed. Remove the applicator from the tube. Put the cap back on the tube.  Use the applicator to put the correct amount of medicine into your vagina. Lie on your back with your knees bent. Hold the full applicator in one hand. Put the applicator into your vagina (like you would a tampon) as far as you can. Slowly press the plunger until it stops. Withdraw the applicator. The medicine will be left behind in the vagina.  Wash the applicator with soap and lukewarm water after every use. Rinse and dry it well.  While using this medicine, wear a sanitary napkin to protect your clothing.  What if I miss a dose?  If you are not sure of what to do if you miss a dose, contact your healthcare provider.  What if I overdose?  An acute overdose of this medicine is not likely to cause life-threatening symptoms. If you think that you or anyone else may have taken too much of this medicine, call the poison control center at [phone].  What should I watch out for?   Hormone therapy, also known as hormone replacement therapy (HRT) offers both benefits and risks. Estrogen has previously been prescribed to help prevent bone loss (osteoporosis). This is no longer advised because it may be harmful. Estrogen also increases the risk of stroke, blood clots in the lungs and legs, heart disease, breast cancer, and possibly dementia. Also, estrogen taken without progestin increases the risk of uterine cancer if you still have your uterus.  Most experts now recommend short-term use (up to 3 or 4 years) if hormone therapy is needed to relieve menopausal symptoms and prevent bone loss. You should take hormones only when you need them, at the lowest effective dosage, and only for as long as you need them. The risk of medical problems such as stroke or cancer  increases further with higher doses and longer treatments. Talk with your healthcare provider about this.  You need to see your provider regularly (every 6 months) for checkups to find out if this medicine is still needed and if you are having any side effects. Keep all your appointments. Do not take this medicine for longer than 1 year without a complete physical exam.  You should also have a breast exam and mammogram every year or as often as your healthcare provider advises.  If you need emergency care, surgery, lab tests, or dental work, tell the healthcare provider or dentist you are using this medicine. You may need to stop taking hormones at least 4 to 6 weeks before the surgery and bedrest.  Tell healthcare providers you are taking this medicine before you have lab tests. This medicine may change blood test results such as cholesterol, liver function, and thyroid levels.  Sometimes this medicine causes dark spots to develop on your skin, especially with long exposure to the sun. The spots usually go away when you stop using this medicine. While you are taking this medicine, avoid long exposure to the sun. Wear protective clothing, a hat, and sunscreen lotion when you need to be outdoors. Do not use a sunlamp.  This medicine may weaken condoms, diaphragms, and cervical caps. Talk with your healthcare provider about this.  If you wear contact lenses and notice a change in your vision or it becomes difficult to wear your lenses, contact your healthcare provider.  This medicine may increase your HDL cholesterol levels, decrease your LDL levels, and increase your blood triglyceride levels. Talk to your healthcare provider about this.  If you have diabetes: This medicine may affect your blood sugar level and change the amount of insulin or other diabetes medicines you may need. Talk to your healthcare provider about this.  What are the possible side effects?   Along with its needed effects, your medicine may cause  some unwanted side effects. Some side effects may be very serious. Some side effects may go away as your body adjusts to the medicine. Tell your healthcare provider if you have any side effects that continue or get worse.  Life-threatening (Report these to your healthcare provider right away. If you are unable to reach your healthcare provider right away, get emergency medical care or call 911 for help.): Allergic reaction (hives; itching; rash; trouble breathing; swelling of your lips, tongue and throat); sudden weakness, numbness, or tingling, especially on one side of your body; sudden or severe headache; sudden trouble with vision, speech, balance, or walking; crushing chest pain or chest heaviness; coughing up blood; sudden shortness of breath; swelling.  Serious (Report these to your healthcare provider right away.): Unexplained bleeding from your vagina; dizziness, or fainting; breast lumps; redness, warmth, or swelling in your hands, ankles or feet; increased blood pressure; discomfort from contact lenses, vision changes; yellowing of the skin or eyes, especially with fever, tiredness, loss of appetite, dark urine, light-colored bowel movements.  Other: Changes in menstruation, breast tenderness, nausea, vomiting, acne, hair loss, cramping, minor bloating, depression, anxiety, weight gain or loss, headache, sensitivity to the sun; darkening of skin on the face; vaginal itch.  What products might interact with this medicine?   When you take this medicine with other medicines, it can change the way this or any of the other medicines work. Nonprescription medicines, vitamins, natural remedies, and certain foods may also interact. Using these products together might cause harmful side effects. Talk to your healthcare provider if you are taking:    Antibiotics such as ciprofloxacin (Cipro), clarithromycin (Biaxin), erythromycin (E.E.S., Dany-Tab, Erythrocin), rifabutin (Mycobutin), rifampin (Rifadin), and  rifapentine (Priftin)    Antidepressants such as amitriptyline, clomipramine, imipramine (Tofranil), and nortriptyline (Pamelor)    Antifungal medicines such as fluconazole (Diflucan), itraconazole (Sporanox), ketoconazole (Nizoral), posaconazole (Noxafil), and voriconazole (Vfend)    Antiseizure medicines such as carbamazepine (Carbatrol, Epitol, Equetro, Tegretol), ethotoin (Peganone), felbamate (Felbatol), fosphenytoin (Cerebyx), gabapentin (Neurontin), lamotrigine (Lamictal), levetiracetam (Keppra), oxcarbazepine (Trileptal), phenytoin (Dilantin, Phenytek), primidone (Mysoline), tiagabine (Gabitril), topiramate (Qudexy, Topamax, Trokendi), and valproic acid (Depacon, Depakene, Depakote)    Aprepitant (Emend) and fosaprepitant (Emend)    Barbiturates such as butabarbital (Butisol), pentobarbital (Nembutal), phenobarbital, and secobarbital (Seconal)    Bosentan (Tracleer)    Bupropion (Aplenzin, Forfivo, Wellbutrin, Buproban, Zyban)    Calcium channel blockers such as diltiazem (Cardizem, Cartia, Tiazac) and verapamil (Calan, Covera, Verelan)    Cancer medicines such as anastrozole (Arimidex), exemestane (Aromasin), letrozole (Femara), and tamoxifen    Cimetidine (Tagamet)    Corticosteroids such as betamethasone, cortisone, dexamethasone, fludrocortisone, hydrocortisone (A-Hydrocort, Cortef), methylprednisolone (Medrol, Solu-Medrol), prednisolone (Omnipred, Orapred, Prelone), prednisone (Prednisone Intensol), and triamcinolone (Aristospan, Kenalog)    Cyclosporine (Gengraf, Neoral, Sandimmune)    Dantrolene (Dantrium)    Diabetes medicines such as acarbose (Precose), glipizide (Glucotrol), glyburide (DiaBeta, Glynase), insulin, metformin (Fortamet, Glucophage, Riomet), miglitol (Glyset), nateglinide (Starlix), pioglitazone (Actos), repaglinide (Prandin), and rosiglitazone (Avandia)    Doxepin (Silenor)    HIV medicines such as atazanavir (Reyataz), darunavir (Prezista), delavirdine (Rescriptor), efavirenz  (Sustiva), etravirine (Intelence), fosamprenavir (Lexiva), indinavir (Crixivan), lopinavir/ritonavir (Kaletra), nelfinavir (Viracept), nevirapine (Viramune), ritonavir (Norvir), saquinavir (Invirase), stavudine (Zerit), and tipranavir (Aptivus)    Isoniazid    Isotretinoin (Absorica, Amnesteem, Claravis, Myorisan, Zenatane)    Methotrexate (Otrexup, Rasuvo, Rheumatrex, Trexall)    Natural remedies such as alfalfa, black cohosh, bloodroot, chasteberry, dong quai, evening primrose oil, ginseng, licorice, red clover, Puako's wort, saw palmetto, soy, topical progesterone, and wild yam    Raloxifene (Evista)    Theophylline    Thyroid medicines such as levothyroxine (Levo-T, Levothroid, Levoxyl, Synthroid, Unithroid), liothyronine (Cytomel, Triostat), liotrix (Thyrolar), and thyroid USP ([place] Thyroid, Nature-Throid)    Tizanidine (Zanaflex)    Warfarin (Coumadin)  If you are not sure if your medicines might interact, ask your pharmacist or healthcare provider. Keep a list of all your medicines with you. List all the prescription medicines, nonprescription medicines, supplements, natural remedies, and vitamins that you take. Be sure that you tell all healthcare providers who treat you about all the products you are taking.   How should I store this medicine?   Store this medicine at room temperature. Keep the container tightly closed. Protect it from heat, high humidity, and bright light.    This advisory includes selected information only and may not include all side effects of this medicine or interactions with other medicines. Ask your healthcare provider or pharmacist for more information or if you have any questions.  Ask your pharmacist for the best way to dispose of outdated medicine or medicine you have not used. Do not throw medicine in the trash.  Keep all medicines out of the reach of children.   Do not share medicines with other people.   Developed by [facility].  Medication Advisor 2016.3 published by  [employer].  Last modified: 2016-03-23  Last reviewed: 2015-06-30  This content is reviewed periodically and is subject to change as new health information becomes available. The information is intended to inform and educate and is not a replacement for medical evaluation, advice, diagnosis or treatment by a healthcare professional.  References   Medication Advisor 2016.3 Index    Copyright   2016 [employer], a division of [employer] All rights reserved.         [place]: Have You Had Your Mammogram?     Mammograms  Why are mammograms important?            Mammograms can help women detect breast cancer early.            A mammogram may find the smallest cancers before a lump can be felt.            Early detection means more choices for treatment if you do have cancer.  Reasons Women Give for Avoiding a Mammogram  Do you or someone you know say the following about mammograms?   There s no breast cancer in my family.    I don t have time.    It might hurt.   There are many reasons why women say they do not get regular mammograms. The following are some of their reasons along with some important information for you to know.             I can t afford a mammogram.   Most insurance companies cover the cost of the screening (you may have a copay).     Check with your insurance provider to see if the mammogram(s) will be covered. Your health care provider s recommendation for services does not guarantee coverage by your insurance provider.    Medicare now covers yearly mammograms. Free and low-cost mammograms are available for women with little or no insurance. Call the American Cancer Society at 1-512.764.3380 for more information about these programs.               No one in my family has ever had breast cancer. I don t need screening.   Being a woman and getting older are the two greatest risk factors for getting breast cancer. The majority of all breast cancers occur in women with no other risk  factors.    Breast cancer risk is increased if other close members of your family (mother or sister) had breast cancer.    You have a slightly increased risk of breast cancer if you:  -       had your first period before age 12  -       have never given birth  -       gave birth to your first child after age 35  -       started menopause after age 55.  Obesity is linked to an increased risk of breast cancer.     Please talk with your health care provider if you have any questions or concerns about these risk factors.                My health care provider never told me to get a mammogram.   If your health care provider hasn t told you about the need for a mammogram, ask him or her.                I don t have time to get a mammogram.   The entire test usually takes less than 30 minutes. It s important to take the time to care for yourself and your health.                I heard it hurts. I don t want to go through that.   You may feel some brief discomfort, but it lasts less than 30 seconds. A technologist will position your breast and compress it to get a good  picture.      If you have sensitive breasts, try having your mammogram at a time of the month when your breasts will be least tender. For example, avoid scheduling a mammogram the week before your period. This will help lessen discomfort.                I m better off not knowing.   More than 80 percent of breast lumps are not cancerous. You can gain peace of mind from taking control of your health and having regular mammograms.     You Can See the Difference a Mammogram Makes    Regular mammograms are your best way to find tumors at their smallest when treatment can be the most successful.  Breast Cancer Screening Schedule  When to start having mammograms to screen for breast cancer, and how often to have them, is a personal decision. It should be based on your preferences, your values and your risk for developing breast cancer. [employer] recommends  that you and your health care provider together determine when mammograms are right for you.  [employer] recommends the following guidelines for women who have an average risk for breast cancer, based on American Cancer Society guidelines:            Age 40 to 44: Mammograms are optional.             Age 45 to 54: Have a mammogram every year.            Age 55 and older: Have a mammogram every year, or transition to having one every 2 years. Continue to have mammograms as long as your health is good.  If you have a  higher than average risk for breast cancer, your health care provider may recommend a different schedule.  Talk with your health care provider about your risk level. Together, you and your health care provider can decide what screening schedule is right for you.   2016 [employer]. TM - A TRADEMARK OF [employer]  OTHER TRADEMARKS USED ARE OWNED BY THEIR RESPECTIVE OWNERS  THIS FACT SHEET DOES NOT REPLACE MEDICAL OR PROFESSIONAL ADVICE; IT IS ONLY A GUIDE  [id number] (12/16)

## 2018-01-03 NOTE — PATIENT INSTRUCTIONS
Patient Information     Patient Name MRN Sex Celio Jacobs [id number] Female 1960      Patient Instructions by Sasha Mcconnell MD at 3/6/2017  2:15 PM     Author:  Sasha Mcconnell MD Service:  (none) Author Type:  Physician     Filed:  3/6/2017  3:13 PM Encounter Date:  3/6/2017 Status:  Signed     :  Sasha Mcconnell MD (Physician)            Pap every 3-5 years  Annual Mammograms  Start vaginal estrogen nightly x 2 weeks then 2-3 times per week  Schedule fasting lab appointment  Monitor lesion on lip for change in size, bleeding

## 2018-01-03 NOTE — TELEPHONE ENCOUNTER
Patient Information     Patient Name MRN Celio Adan [id number] Female 1960      Telephone Encounter by Rika Her at 3/10/2017  1:50 PM     Author:  Rika Her Service:  (none) Author Type:  (none)     Filed:  3/10/2017  1:51 PM Encounter Date:  3/10/2017 Status:  Signed     :  Rika Her            Medication pended.  Rika Her LPN .............3/10/2017  1:50 PM

## 2018-01-03 NOTE — TELEPHONE ENCOUNTER
Patient Information     Patient Name MRN Sex Celio Jacobs [id number] Female 1960      Telephone Encounter by Maria Luisa Ramsay at 3/10/2017  2:53 PM     Author:  Maria Luisa Ramsay Service:  (none) Author Type:  (none)     Filed:  3/10/2017  2:53 PM Encounter Date:  3/10/2017 Status:  Signed     :  Maria Luisa Ramsay            Lab notified of TSH order.

## 2018-01-03 NOTE — NURSING NOTE
Patient Information     Patient Name MRN Celio Adan [id number] Female 1960      Nursing Note by Isabel Parish at 2017  8:30 AM     Author:  Isabel Parish Service:  (none) Author Type:  (none)     Filed:  2017  8:46 AM Encounter Date:  2017 Status:  Signed     :  Isabel Parish            Patient went skiing  and Monday and she fell. She is wondering if she pulled a muscle. She also has had frequent urination. She would like her flu shot today.    Isabel Parish LPN.......................... 2017  8:42 AM

## 2018-01-26 VITALS
SYSTOLIC BLOOD PRESSURE: 114 MMHG | HEIGHT: 66 IN | BODY MASS INDEX: 23.78 KG/M2 | SYSTOLIC BLOOD PRESSURE: 112 MMHG | DIASTOLIC BLOOD PRESSURE: 80 MMHG | HEART RATE: 80 BPM | WEIGHT: 145 LBS | HEIGHT: 66 IN | DIASTOLIC BLOOD PRESSURE: 66 MMHG | BODY MASS INDEX: 23.3 KG/M2 | HEART RATE: 78 BPM | WEIGHT: 148 LBS

## 2018-01-31 ENCOUNTER — DOCUMENTATION ONLY (OUTPATIENT)
Dept: FAMILY MEDICINE | Facility: OTHER | Age: 58
End: 2018-01-31

## 2018-01-31 RX ORDER — ESTRADIOL 0.1 MG/G
CREAM VAGINAL
COMMUNITY
Start: 2017-03-14 | End: 2018-12-29

## 2018-03-25 ENCOUNTER — HEALTH MAINTENANCE LETTER (OUTPATIENT)
Age: 58
End: 2018-03-25

## 2018-05-15 ENCOUNTER — DOCUMENTATION ONLY (OUTPATIENT)
Dept: OTHER | Facility: CLINIC | Age: 58
End: 2018-05-15

## 2018-05-15 PROBLEM — Z71.89 ACP (ADVANCE CARE PLANNING): Chronic | Status: ACTIVE | Noted: 2018-05-15

## 2018-12-29 ENCOUNTER — OFFICE VISIT (OUTPATIENT)
Dept: FAMILY MEDICINE | Facility: OTHER | Age: 58
End: 2018-12-29
Attending: NURSE PRACTITIONER
Payer: OTHER GOVERNMENT

## 2018-12-29 VITALS
HEART RATE: 68 BPM | SYSTOLIC BLOOD PRESSURE: 144 MMHG | WEIGHT: 150 LBS | TEMPERATURE: 98 F | BODY MASS INDEX: 24.58 KG/M2 | DIASTOLIC BLOOD PRESSURE: 88 MMHG

## 2018-12-29 DIAGNOSIS — M62.830 BACK MUSCLE SPASM: Primary | ICD-10-CM

## 2018-12-29 DIAGNOSIS — M54.9 MID BACK PAIN ON LEFT SIDE: ICD-10-CM

## 2018-12-29 PROCEDURE — 25000128 H RX IP 250 OP 636: Performed by: NURSE PRACTITIONER

## 2018-12-29 PROCEDURE — 96372 THER/PROPH/DIAG INJ SC/IM: CPT

## 2018-12-29 RX ORDER — METHOCARBAMOL 500 MG/1
500 TABLET, FILM COATED ORAL 3 TIMES DAILY PRN
Qty: 20 TABLET | Refills: 0 | Status: SHIPPED | OUTPATIENT
Start: 2018-12-29 | End: 2022-06-23

## 2018-12-29 RX ORDER — KETOROLAC TROMETHAMINE 30 MG/ML
30 INJECTION, SOLUTION INTRAMUSCULAR; INTRAVENOUS ONCE
Status: COMPLETED | OUTPATIENT
Start: 2018-12-29 | End: 2018-12-29

## 2018-12-29 RX ORDER — INFLUENZA A VIRUS A/NEBRASKA/14/2019 (H1N1) ANTIGEN (MDCK CELL DERIVED, PROPIOLACTONE INACTIVATED), INFLUENZA A VIRUS A/DELAWARE/39/2019 (H3N2) ANTIGEN (MDCK CELL DERIVED, PROPIOLACTONE INACTIVATED), INFLUENZA B VIRUS B/SINGAPORE/INFTT-16-0610/2016 ANTIGEN (MDCK CELL DERIVED, PROPIOLACTONE INACTIVATED), INFLUENZA B VIRUS B/DARWIN/7/2019 ANTIGEN (MDCK CELL DERIVED, PROPIOLACTONE INACTIVATED) 15; 15; 15; 15 UG/.5ML; UG/.5ML; UG/.5ML; UG/.5ML
INJECTION, SUSPENSION INTRAMUSCULAR
Refills: 0 | COMMUNITY
Start: 2018-11-23 | End: 2022-06-23

## 2018-12-29 RX ADMIN — KETOROLAC TROMETHAMINE 30 MG: 30 INJECTION, SOLUTION INTRAMUSCULAR at 15:45

## 2018-12-29 ASSESSMENT — PAIN SCALES - GENERAL: PAINLEVEL: EXTREME PAIN (8)

## 2018-12-29 NOTE — PROGRESS NOTES
"HPI:    Celio Yap is a 58 year old female  who presents to clinic today for back pain.    Left mid back pain for the past 2 days after shoveling snow.  Prior to that she was traveling by plane and car and thinks that stiffened the back initially.  States \"the pain is all muscle spasm and not my spine.\"  Difficulty sleeping due to pain.  Laying worsens pain.  Prolonged sitting worsens pain.  Walking lessens pain.  Denies any lower extremity numbness, weakness, or tingling.  No foot drop.  No bowel or bladder incontinence or concerns.  No dysuria, frequency or urgency.   No falls.     Using heat and Ibuprofen 800 mg about TID, last about 6 hours ago.  Using icy hot topical.        Past Medical History:   Diagnosis Date     Asymptomatic menopausal state     No Comments Provided     Personal history of other medical treatment (CODE)     ,  X2     Past Surgical History:   Procedure Laterality Date     ENDOSCOPIC STRIPPING VEIN(S)      Varicose vein laser procedures     LAPAROSCOPIC ABLATION ENDOMETRIOSIS      , Endometrial ablation     OTHER SURGICAL HISTORY      99334.0,XR BREAST STEREOTACTIC WIRE LOCAL ([place])     OTHER SURGICAL HISTORY      [id number],LUMBAR DISKECTOMY     Social History     Tobacco Use     Smoking status: Never Smoker     Smokeless tobacco: Never Used   Substance Use Topics     Alcohol use: Yes     Alcohol/week: 0.0 oz     Comment: Alcoholic Drinks/day: one glass of wine per lucila     Current Outpatient Medications   Medication Sig Dispense Refill     FLUCELVAX QUADRIVALENT SUSP ADM 0.5ML IM UTD  0     No Known Allergies      Past medical history, past surgical history, current medications and allergies reviewed and accurate to the best of my knowledge.        ROS:  Refer to HPI    /88 (BP Location: Left arm, Patient Position: Sitting, Cuff Size: Adult Regular)   Pulse 68   Temp 98  F (36.7  C) (Tympanic)   Wt 68 kg (150 lb)   BMI 24.58 kg/m      EXAM:  General Appearance: Well " appearing adult female, appropriate appearance for age. No acute distress  Respiratory: normal chest wall and respirations.  Normal effort.  Clear to auscultation bilaterally, no wheezing, crackles or rhonchi.  No increased work of breathing.  No cough appreciated.  Cardiac: RRR with no murmurs  Musculoskeletal:  No thoracic or lumbar spinal tenderness to palpation.  Left thoracic paraspinal area with generalized tenderness on palpation.  Right paraspinal area without tenderness.  Normal bilateral straight leg raise. Changes positions without difficulty.   Normal gait.  Equal movement of bilateral upper extremities.  Equal movement of bilateral lower extremities.    Dermatological: no rashes noted of back  Psychological: normal affect, alert and pleasant      Labs:  Not clinically indicated    Xray:  Not clinically indicated       ASSESSMENT/PLAN:  1. Mid back pain on left side    - ketorolac (TORADOL) injection 30 mg; Inject 1 mL (30 mg) into the muscle once    2. Back muscle spasm    - methocarbamol (ROBAXIN) 500 MG tablet; Take 1 tablet (500 mg) by mouth 3 times daily as needed for muscle spasms  Dispense: 20 tablet; Refill: 0    NO Ibuprofen for the next 24 hours as was given Toradol injection in clinic.    OTC Tylenol PRN    Alternate ice and heat PRN    Topical pain cream or patches PRN    Discussed warning signs/symptoms indicative of need to f/u    Follow up if symptoms persist or worsen or concerns

## 2018-12-29 NOTE — PATIENT INSTRUCTIONS
Robaxin up to 3 times per day as needed for muscle spasm    Alternate ice and heat    toradol injection given in clinic so no Ibuprofen for the next 24 hours    May use tylenol if needed    Follow up if worsening or concerns

## 2018-12-29 NOTE — NURSING NOTE
Patient presents to the clinic for upper back spasms x 2 days. Patient reports straining herself while shoveling snow. She has used advil and icy hot pads for relief.   Isatu CLANCY CMA...12/29/2018 3:05 PM

## 2020-03-11 ENCOUNTER — HEALTH MAINTENANCE LETTER (OUTPATIENT)
Age: 60
End: 2020-03-11

## 2020-12-27 ENCOUNTER — HEALTH MAINTENANCE LETTER (OUTPATIENT)
Age: 60
End: 2020-12-27

## 2021-04-25 ENCOUNTER — HEALTH MAINTENANCE LETTER (OUTPATIENT)
Age: 61
End: 2021-04-25

## 2021-10-09 ENCOUNTER — HEALTH MAINTENANCE LETTER (OUTPATIENT)
Age: 61
End: 2021-10-09

## 2022-03-26 ENCOUNTER — HEALTH MAINTENANCE LETTER (OUTPATIENT)
Age: 62
End: 2022-03-26

## 2022-05-21 ENCOUNTER — HEALTH MAINTENANCE LETTER (OUTPATIENT)
Age: 62
End: 2022-05-21

## 2022-06-23 ENCOUNTER — OFFICE VISIT (OUTPATIENT)
Dept: FAMILY MEDICINE | Facility: OTHER | Age: 62
End: 2022-06-23
Attending: FAMILY MEDICINE
Payer: OTHER GOVERNMENT

## 2022-06-23 VITALS
BODY MASS INDEX: 25.71 KG/M2 | HEART RATE: 80 BPM | TEMPERATURE: 99.2 F | SYSTOLIC BLOOD PRESSURE: 148 MMHG | HEIGHT: 66 IN | OXYGEN SATURATION: 96 % | DIASTOLIC BLOOD PRESSURE: 94 MMHG | RESPIRATION RATE: 18 BRPM | WEIGHT: 160 LBS

## 2022-06-23 DIAGNOSIS — Z12.31 ENCOUNTER FOR SCREENING MAMMOGRAM FOR BREAST CANCER: Primary | ICD-10-CM

## 2022-06-23 DIAGNOSIS — L82.0 INFLAMED SEBORRHEIC KERATOSIS: ICD-10-CM

## 2022-06-23 DIAGNOSIS — R03.0 ELEVATED BLOOD PRESSURE READING WITHOUT DIAGNOSIS OF HYPERTENSION: ICD-10-CM

## 2022-06-23 PROCEDURE — 99213 OFFICE O/P EST LOW 20 MIN: CPT | Performed by: FAMILY MEDICINE

## 2022-06-23 ASSESSMENT — PAIN SCALES - GENERAL: PAINLEVEL: NO PAIN (0)

## 2022-06-23 NOTE — PROGRESS NOTES
"  Assessment & Plan     Encounter for screening mammogram for breast cancer  Due for screening mammogram  - MA Screen Bilateral w/Neftaly; Future    Elevated blood pressure reading without diagnosis of hypertension  Recommend returning for fasting labs.  Monitor blood pressure.  Recommend low-salt diet.  Decrease caffeine intake.  - Comprehensive Metabolic Panel; Future  - Lipid Panel; Future  - TSH Reflex GH; Future  - CBC and Differential; Future    Inflamed seborrheic keratosis  By description, sounds like she had a seborrheic keratosis that has since fallen off.  Reassurance provided.               BMI:   Estimated body mass index is 25.63 kg/m  as calculated from the following:    Height as of this encounter: 1.683 m (5' 6.25\").    Weight as of this encounter: 72.6 kg (160 lb).       There are no Patient Instructions on file for this visit.    No follow-ups on file.    Sasha Mcconnell MD  [facility]    Jackie Pickens is a 61 year old, presenting for the following health issues:     60 yo female presents for skin check.  She had a raised scaly lesion that has since fallen off.  She is concerned it could be a melanoma.  Derm Problem (shoulder)      History of Present Illness       Reason for visit:  Skin check  Symptom onset:  More than a month  Symptoms include:  Coloration, raised surface, into a crusty surface that then came off  Symptom progression:  Worsening  What makes it worse:  Not knowing what it is  What makes it better:  Diagnosis    She eats 2-3 servings of fruits and vegetables daily.She consumes 2 sweetened beverage(s) daily.She exercises with enough effort to increase her heart rate 9 or less minutes per day.  She exercises with enough effort to increase her heart rate 3 or less days per week.   She is taking medications regularly.             Review of Systems   Constitutional, HEENT, cardiovascular, pulmonary, gi and gu systems are negative, except as otherwise " "noted.      Objective    BP (!) 148/94   Pulse 80   Temp 99.2  F (37.3  C) (Tympanic)   Resp 18   Ht 1.683 m (5' 6.25\")   Wt 72.6 kg (160 lb)   LMP  (LMP Unknown)   SpO2 96%   Breastfeeding No   BMI 25.63 kg/m    Body mass index is 25.63 kg/m .  Physical Exam   Previous location of the skin lesion is examined.  Mild erythema.  Description is consistent with previous seborrheic keratosis.                    .  ..  "

## 2022-06-23 NOTE — NURSING NOTE
Patient is here for concerns of a spot on her right shoulder she has been monitoring for a year.     No LMP recorded (lmp unknown). Patient has had an ablation.  Medication Reconciliation: complete    FOOD SECURITY SCREENING QUESTIONS  Hunger Vital Signs:  Within the past 12 months we worried whether our food would run out before we got money to buy more. Never  Within the past 12 months the food we bought just didn't last and we didn't have money to get more. Never  Rika Her LPN 6/23/2022 3:28 PM       Advance care directive on file? yes  Advance care directive provided to patient? na       Rika Her LPN

## 2022-07-18 ENCOUNTER — HOSPITAL ENCOUNTER (OUTPATIENT)
Dept: MAMMOGRAPHY | Facility: OTHER | Age: 62
Discharge: HOME | End: 2022-07-18
Attending: FAMILY MEDICINE
Payer: OTHER GOVERNMENT

## 2022-07-18 ENCOUNTER — LAB (OUTPATIENT)
Dept: LAB | Facility: OTHER | Age: 62
End: 2022-07-18
Attending: FAMILY MEDICINE
Payer: OTHER GOVERNMENT

## 2022-07-18 DIAGNOSIS — R03.0 ELEVATED BLOOD PRESSURE READING WITHOUT DIAGNOSIS OF HYPERTENSION: ICD-10-CM

## 2022-07-18 DIAGNOSIS — Z12.31 ENCOUNTER FOR SCREENING MAMMOGRAM FOR BREAST CANCER: ICD-10-CM

## 2022-07-18 LAB
ALBUMIN SERPL-MCNC: 4.3 G/DL (ref 3.5–5.7)
ALP SERPL-CCNC: 44 U/L (ref 34–104)
ALT SERPL W P-5'-P-CCNC: 8 U/L (ref 7–52)
ANION GAP SERPL CALCULATED.3IONS-SCNC: 6 MMOL/L (ref 3–14)
AST SERPL W P-5'-P-CCNC: 19 U/L (ref 13–39)
BASOPHILS # BLD AUTO: 0 10E3/UL (ref 0–0.2)
BASOPHILS NFR BLD AUTO: 1 %
BILIRUB SERPL-MCNC: 1.4 MG/DL (ref 0.3–1)
BUN SERPL-MCNC: 17 MG/DL (ref 7–25)
CALCIUM SERPL-MCNC: 9.2 MG/DL (ref 8.6–10.3)
CHLORIDE BLD-SCNC: 107 MMOL/L (ref 98–107)
CHOLEST SERPL-MCNC: 235 MG/DL
CO2 SERPL-SCNC: 26 MMOL/L (ref 21–31)
CREAT SERPL-MCNC: 0.82 MG/DL (ref 0.6–1.2)
EOSINOPHIL # BLD AUTO: 0.1 10E3/UL (ref 0–0.7)
EOSINOPHIL NFR BLD AUTO: 2 %
ERYTHROCYTE [DISTWIDTH] IN BLOOD BY AUTOMATED COUNT: 15 % (ref 10–15)
FASTING STATUS PATIENT QL REPORTED: ABNORMAL
GFR SERPL CREATININE-BSD FRML MDRD: 80 ML/MIN/1.73M2
GLUCOSE BLD-MCNC: 95 MG/DL (ref 70–105)
HCT VFR BLD AUTO: 45.6 % (ref 35–47)
HDLC SERPL-MCNC: 56 MG/DL (ref 23–92)
HGB BLD-MCNC: 15.1 G/DL (ref 11.7–15.7)
IMM GRANULOCYTES # BLD: 0 10E3/UL
IMM GRANULOCYTES NFR BLD: 0 %
LDLC SERPL CALC-MCNC: 160 MG/DL
LYMPHOCYTES # BLD AUTO: 2.4 10E3/UL (ref 0.8–5.3)
LYMPHOCYTES NFR BLD AUTO: 45 %
MCH RBC QN AUTO: 29.9 PG (ref 26.5–33)
MCHC RBC AUTO-ENTMCNC: 33.1 G/DL (ref 31.5–36.5)
MCV RBC AUTO: 90 FL (ref 78–100)
MONOCYTES # BLD AUTO: 0.5 10E3/UL (ref 0–1.3)
MONOCYTES NFR BLD AUTO: 11 %
NEUTROPHILS # BLD AUTO: 2.1 10E3/UL (ref 1.6–8.3)
NEUTROPHILS NFR BLD AUTO: 41 %
NONHDLC SERPL-MCNC: 179 MG/DL
NRBC # BLD AUTO: 0 10E3/UL
NRBC BLD AUTO-RTO: 0 /100
PLATELET # BLD AUTO: 189 10E3/UL (ref 150–450)
POTASSIUM BLD-SCNC: 3.9 MMOL/L (ref 3.5–5.1)
PROT SERPL-MCNC: 6.8 G/DL (ref 6.4–8.9)
RBC # BLD AUTO: 5.05 10E6/UL (ref 3.8–5.2)
SODIUM SERPL-SCNC: 139 MMOL/L (ref 134–144)
TRIGL SERPL-MCNC: 94 MG/DL
TSH SERPL DL<=0.005 MIU/L-ACNC: 3.44 MU/L (ref 0.4–4)
WBC # BLD AUTO: 5.1 10E3/UL (ref 4–11)

## 2022-07-18 PROCEDURE — 84443 ASSAY THYROID STIM HORMONE: CPT | Mod: ZL

## 2022-07-18 PROCEDURE — 85025 COMPLETE CBC W/AUTO DIFF WBC: CPT | Mod: ZL

## 2022-07-18 PROCEDURE — 77067 SCR MAMMO BI INCL CAD: CPT

## 2022-07-18 PROCEDURE — 83718 ASSAY OF LIPOPROTEIN: CPT | Mod: ZL

## 2022-07-18 PROCEDURE — 36415 COLL VENOUS BLD VENIPUNCTURE: CPT | Mod: ZL

## 2022-07-18 PROCEDURE — 80053 COMPREHEN METABOLIC PANEL: CPT | Mod: ZL

## 2022-07-19 ENCOUNTER — OFFICE VISIT (OUTPATIENT)
Dept: FAMILY MEDICINE | Facility: OTHER | Age: 62
End: 2022-07-19
Attending: FAMILY MEDICINE
Payer: OTHER GOVERNMENT

## 2022-07-19 VITALS
SYSTOLIC BLOOD PRESSURE: 130 MMHG | OXYGEN SATURATION: 97 % | HEIGHT: 66 IN | RESPIRATION RATE: 18 BRPM | DIASTOLIC BLOOD PRESSURE: 82 MMHG | TEMPERATURE: 99.1 F | HEART RATE: 93 BPM | BODY MASS INDEX: 25.13 KG/M2 | WEIGHT: 156.4 LBS

## 2022-07-19 DIAGNOSIS — Z12.11 COLON CANCER SCREENING: ICD-10-CM

## 2022-07-19 DIAGNOSIS — Z00.00 ANNUAL PHYSICAL EXAM: Primary | ICD-10-CM

## 2022-07-19 DIAGNOSIS — Z12.4 CERVICAL CANCER SCREENING: ICD-10-CM

## 2022-07-19 PROCEDURE — 87624 HPV HI-RISK TYP POOLED RSLT: CPT | Mod: ZL | Performed by: FAMILY MEDICINE

## 2022-07-19 PROCEDURE — G0123 SCREEN CERV/VAG THIN LAYER: HCPCS | Performed by: FAMILY MEDICINE

## 2022-07-19 PROCEDURE — 99396 PREV VISIT EST AGE 40-64: CPT | Performed by: FAMILY MEDICINE

## 2022-07-19 ASSESSMENT — ENCOUNTER SYMPTOMS
MYALGIAS: 0
HEARTBURN: 0
FREQUENCY: 0
DYSURIA: 0
WEAKNESS: 0
HEADACHES: 0
DIZZINESS: 0
CHILLS: 0
NERVOUS/ANXIOUS: 0
ARTHRALGIAS: 0
SHORTNESS OF BREATH: 0
DIARRHEA: 0
PARESTHESIAS: 0
COUGH: 0
NAUSEA: 0
EYE PAIN: 0
ABDOMINAL PAIN: 0
BREAST MASS: 0
SORE THROAT: 0
CONSTIPATION: 0
FEVER: 0
HEMATURIA: 0
HEMATOCHEZIA: 0
PALPITATIONS: 0
JOINT SWELLING: 0

## 2022-07-19 ASSESSMENT — PAIN SCALES - GENERAL: PAINLEVEL: NO PAIN (0)

## 2022-07-19 NOTE — PROGRESS NOTES
SUBJECTIVE:   CC: Celio Yap is an 61 year old woman who presents for preventive health visit.     61-year-old female presents for annual preventive physical.  She is amenorrheic.  No history of abnormal Pap smears.  Is due for colon cancer screening and Pap smear.    No other acute concerns    Healthy Habits:     Getting at least 3 servings of Calcium per day:  Yes    Bi-annual eye exam:  NO    Dental care twice a year:  Yes    Sleep apnea or symptoms of sleep apnea:  None    Diet:  Regular (no restrictions)    Frequency of exercise:  2-3 days/week    Duration of exercise:  Less than 15 minutes    Taking medications regularly:  Not Applicable    Medication side effects:  Not applicable    PHQ-2 Total Score: 0    Additional concerns today:  Yes        Today's PHQ-2 Score:   PHQ-2 ( 1999 [employer]) 7/19/2022   Q1: Little interest or pleasure in doing things 0   Q2: Feeling down, depressed or hopeless 0   PHQ-2 Score 0   Q1: Little interest or pleasure in doing things Not at all   Q2: Feeling down, depressed or hopeless Not at all   PHQ-2 Score 0           Social History     Tobacco Use     Smoking status: Never Smoker     Smokeless tobacco: Never Used   Substance Use Topics     Alcohol use: Yes     Alcohol/week: 0.0 standard drinks     Comment: Alcoholic Drinks/day: one glass of wine per lucila         Alcohol Use 7/19/2022   Prescreen: >3 drinks/day or >7 drinks/week? Yes       Reviewed orders with patient.  Reviewed health maintenance and updated orders accordingly - Yes  Labs reviewed in EPIC  BP Readings from Last 3 Encounters:   07/19/22 130/82   06/23/22 (!) 148/94   12/29/18 144/88    Wt Readings from Last 3 Encounters:   07/19/22 70.9 kg (156 lb 6.4 oz)   06/23/22 72.6 kg (160 lb)   12/29/18 68 kg (150 lb)                  Patient Active Problem List   Diagnosis     Postconcussion syndrome     ACP (advance care planning)     Past Surgical History:   Procedure Laterality Date     ENDOSCOPIC STRIPPING  VEIN(S)      Varicose vein laser procedures     LAPAROSCOPIC ABLATION ENDOMETRIOSIS      2008, Endometrial ablation     OTHER SURGICAL HISTORY      14678.0,XR BREAST STEREOTACTIC WIRE LOCAL ([place])     OTHER SURGICAL HISTORY      [id number],LUMBAR DISKECTOMY       Social History     Tobacco Use     Smoking status: Never Smoker     Smokeless tobacco: Never Used   Substance Use Topics     Alcohol use: Yes     Alcohol/week: 0.0 standard drinks     Comment: Alcoholic Drinks/day: one glass of wine per lucila     Family History   Problem Relation Age of Onset     Other - See Comments Mother 72         of abdominal infection     Other - See Comments Father 25         in MVA     Breast Cancer Maternal Aunt         Cancer-breast         No current outpatient medications on file.       Breast Cancer Screening:  Any new diagnosis of family breast, ovarian, or bowel cancer? No    FHS-7:   Breast CA Risk Assessment (FHS-7) 2022   Did any of your first-degree relatives have breast or ovarian cancer? No No   Did any of your relatives have bilateral breast cancer? No -   Did any man in your family have breast cancer? No -   Did any woman in your family have breast and ovarian cancer? No -   Did any woman in your family have breast cancer before age 50 y? No -   Do you have 2 or more relatives with breast and/or ovarian cancer? No Yes   Do you have 2 or more relatives with breast and/or bowel cancer? No -     click delete button to remove this line now  Mammogram Screening: Recommended mammography every 1-2 years with patient discussion and risk factor consideration  Pertinent mammograms are reviewed under the imaging tab.    History of abnormal Pap smear: NO - age 30-65 PAP every 5 years with negative HPV co-testing recommended     Reviewed and updated as needed this visit by clinical staff   Tobacco  Allergies  Meds   Med Hx  Surg Hx  Fam Hx  Soc Hx          Reviewed and updated as needed this visit by  Provider                   Past Medical History:   Diagnosis Date     Asymptomatic menopausal state     No Comments Provided     Personal history of other medical treatment (CODE)     ,  X2      Past Surgical History:   Procedure Laterality Date     ENDOSCOPIC STRIPPING VEIN(S)      Varicose vein laser procedures     LAPAROSCOPIC ABLATION ENDOMETRIOSIS      , Endometrial ablation     OTHER SURGICAL HISTORY      34336.0,XR BREAST STEREOTACTIC WIRE LOCAL ([place])     OTHER SURGICAL HISTORY      [id number],LUMBAR DISKECTOMY       Review of Systems   Constitutional: Negative for chills and fever.   HENT: Negative for congestion, ear pain, hearing loss and sore throat.    Eyes: Negative for pain and visual disturbance.   Respiratory: Negative for cough and shortness of breath.    Cardiovascular: Negative for chest pain, palpitations and peripheral edema.   Gastrointestinal: Negative for abdominal pain, constipation, diarrhea, heartburn, hematochezia and nausea.   Breasts:  Negative for tenderness, breast mass and discharge.   Genitourinary: Negative for dysuria, frequency, genital sores, hematuria, pelvic pain, urgency, vaginal bleeding and vaginal discharge.   Musculoskeletal: Negative for arthralgias, joint swelling and myalgias.   Skin: Negative for rash.   Neurological: Negative for dizziness, weakness, headaches and paresthesias.   Psychiatric/Behavioral: Negative for mood changes. The patient is not nervous/anxious.      CONSTITUTIONAL: NEGATIVE for fever, chills, change in weight  INTEGUMENTARU/SKIN: NEGATIVE for worrisome rashes, moles or lesions  EYES: NEGATIVE for vision changes or irritation  ENT: NEGATIVE for ear, mouth and throat problems  RESP: NEGATIVE for significant cough or SOB  BREAST: NEGATIVE for masses, tenderness or discharge  CV: NEGATIVE for chest pain, palpitations or peripheral edema  GI: NEGATIVE for nausea, abdominal pain, heartburn, or change in bowel habits  : NEGATIVE for unusual  "urinary or vaginal symptoms. Periods are regular.  MUSCULOSKELETAL: NEGATIVE for significant arthralgias or myalgia  NEURO: NEGATIVE for weakness, dizziness or paresthesias  PSYCHIATRIC: NEGATIVE for changes in mood or affect     OBJECTIVE:   /82 (BP Location: Left arm, Patient Position: Sitting, Cuff Size: Adult Regular)   Pulse 93   Temp 99.1  F (37.3  C) (Tympanic)   Resp 18   Ht 1.683 m (5' 6.25\")   Wt 70.9 kg (156 lb 6.4 oz)   LMP  (LMP Unknown)   SpO2 97%   Breastfeeding No   BMI 25.05 kg/m    Physical Exam  GENERAL: healthy, alert and no distress  EYES: Eyes grossly normal to inspection, PERRL and conjunctivae and sclerae normal  HENT: ear canals and TM's normal, nose and mouth without ulcers or lesions  NECK: no adenopathy, no asymmetry, masses, or scars and thyroid normal to palpation  RESP: lungs clear to auscultation - no rales, rhonchi or wheezes  BREAST: normal without masses, tenderness or nipple discharge and no palpable axillary masses or adenopathy  CV: regular rate and rhythm, normal S1 S2, no S3 or S4, no murmur, click or rub, no peripheral edema and peripheral pulses strong  ABDOMEN: soft, nontender, no hepatosplenomegaly, no masses and bowel sounds normal  SKIN: no suspicious lesions or rashes  NEURO: Normal strength and tone, mentation intact and speech normal  PSYCH: mentation appears normal, affect normal/bright    Diagnostic Test Results:  Labs reviewed in Epic    ASSESSMENT/PLAN:       ICD-10-CM    1. Annual physical exam  Z00.00    2. Colon cancer screening  Z12.11 Adult GI  Referral - Procedure Only   3. Cervical cancer screening  Z12.4 Pap Screen with HPV - recommended age 30 - 65 years     Patient is counseled on importance of regular self breast exams and mammograms every 1-2 years starting at age 40. Patient will proceed with pap smears every 3-5 years until age 65. Discussed importance of calcium and vitamin D supplementation and osteoporosis screening. " "Immunizations are updated based on CDC recommendations and patients desire. Reviewed importance of sunscreen, limit sun exposure and monitoring for changing moles with ABCDEs. Recommend seatbelt use and helmets with biking, skiing and ATV/Snowmobile use.    Proceed with colonoscopy.  Continue annual mammograms.      COUNSELING:  Reviewed preventive health counseling, as reflected in patient instructions    Estimated body mass index is 25.05 kg/m  as calculated from the following:    Height as of this encounter: 1.683 m (5' 6.25\").    Weight as of this encounter: 70.9 kg (156 lb 6.4 oz).        She reports that she has never smoked. She has never used smokeless tobacco.      Counseling Resources:  ATP IV Guidelines  Pooled Cohorts Equation Calculator  Breast Cancer Risk Calculator  BRCA-Related Cancer Risk Assessment: FHS-7 Tool  FRAX Risk Assessment  ICSI Preventive Guidelines  Dietary Guidelines for Americans, 2010  USDA's MyPlate  ASA Prophylaxis  Lung CA Screening    Sasha Mcconnell MD  [facility]  "

## 2022-07-19 NOTE — NURSING NOTE
Patient is here for physical.     No LMP recorded (lmp unknown). Patient has had an ablation.  Medication Reconciliation: complete    FOOD SECURITY SCREENING QUESTIONS  Hunger Vital Signs:  Within the past 12 months we worried whether our food would run out before we got money to buy more. Never  Within the past 12 months the food we bought just didn't last and we didn't have money to get more. Never  Rika Her LPN 7/19/2022 11:02 AM       Advance care directive on file? yes  Advance care directive provided to patient? na       Rika Her LPN

## 2022-07-25 LAB
BKR LAB AP GYN ADEQUACY: NORMAL
BKR LAB AP GYN INTERPRETATION: NORMAL
BKR LAB AP HPV REFLEX: NORMAL
BKR LAB AP PREVIOUS ABNORMAL: NORMAL
PATH REPORT.COMMENTS IMP SPEC: NORMAL
PATH REPORT.COMMENTS IMP SPEC: NORMAL
PATH REPORT.RELEVANT HX SPEC: NORMAL

## 2022-07-29 LAB
HUMAN PAPILLOMA VIRUS 16 DNA: NEGATIVE
HUMAN PAPILLOMA VIRUS 18 DNA: NEGATIVE
HUMAN PAPILLOMA VIRUS FINAL DIAGNOSIS: NORMAL
HUMAN PAPILLOMA VIRUS OTHER HR: NEGATIVE

## 2022-08-17 DIAGNOSIS — Z01.818 PRE-OP TESTING: Primary | ICD-10-CM

## 2022-08-17 DIAGNOSIS — Z12.11 SPECIAL SCREENING FOR MALIGNANT NEOPLASMS, COLON: ICD-10-CM

## 2022-08-17 NOTE — TELEPHONE ENCOUNTER
Screening Questions for the Scheduling of Screening Colonoscopies   (If Colonoscopy is diagnostic, Provider should review the chart before scheduling.)  Are you younger than 50 or older than 80?  NO  Do you take aspirin or fish oil?  NO (if yes, tell patient to stop 1 week prior to Colonoscopy)  Do you take warfarin (Coumadin), clopidogrel (Plavix), apixaban (Eliquis), dabigatram (Pradaxa), rivaroxaban (Xarelto) or any blood thinner? NO  Do you use oxygen at home?  NO  Do you have kidney disease? NO  Are you on dialysis? NO  Have you had a stroke or heart attack in the last year? NO  Have you had a stent in your heart or any blood vessel in the last year? NO  Have you had a transplant of any organ? NO  Have you had a colonoscopy or upper endoscopy (EGD) before? YES         When?  13 YEARS AGO  Date of scheduled Colonoscopy. 11/09/2022  Provider [facility]

## 2022-08-18 RX ORDER — POLYETHYLENE GLYCOL 3350, SODIUM CHLORIDE, SODIUM BICARBONATE, POTASSIUM CHLORIDE 420; 11.2; 5.72; 1.48 G/4L; G/4L; G/4L; G/4L
4000 POWDER, FOR SOLUTION ORAL ONCE
Qty: 4000 ML | Refills: 0 | Status: SHIPPED | OUTPATIENT
Start: 2022-08-18 | End: 2022-08-18

## 2022-08-18 RX ORDER — BISACODYL 5 MG
TABLET, DELAYED RELEASE (ENTERIC COATED) ORAL
Qty: 2 TABLET | Refills: 0 | Status: ON HOLD | OUTPATIENT
Start: 2022-08-18 | End: 2022-11-09

## 2022-09-17 ENCOUNTER — HEALTH MAINTENANCE LETTER (OUTPATIENT)
Age: 62
End: 2022-09-17

## 2022-11-09 ENCOUNTER — HOSPITAL ENCOUNTER (OUTPATIENT)
Facility: OTHER | Age: 62
Discharge: HOME | End: 2022-11-09
Attending: SURGERY | Admitting: SURGERY
Payer: OTHER GOVERNMENT

## 2022-11-09 ENCOUNTER — ANESTHESIA EVENT (OUTPATIENT)
Dept: SURGERY | Facility: OTHER | Age: 62
End: 2022-11-09
Payer: OTHER GOVERNMENT

## 2022-11-09 ENCOUNTER — ANESTHESIA (OUTPATIENT)
Dept: SURGERY | Facility: OTHER | Age: 62
End: 2022-11-09
Payer: OTHER GOVERNMENT

## 2022-11-09 VITALS
TEMPERATURE: 97.2 F | SYSTOLIC BLOOD PRESSURE: 108 MMHG | DIASTOLIC BLOOD PRESSURE: 83 MMHG | WEIGHT: 156 LBS | HEART RATE: 57 BPM | RESPIRATION RATE: 14 BRPM | OXYGEN SATURATION: 95 % | BODY MASS INDEX: 24.99 KG/M2

## 2022-11-09 DIAGNOSIS — K52.9 INFLAMMATION OF COLONIC MUCOSA: ICD-10-CM

## 2022-11-09 DIAGNOSIS — K63.5 POLYP OF ASCENDING COLON, UNSPECIFIED TYPE: Primary | ICD-10-CM

## 2022-11-09 PROCEDURE — 250N000009 HC RX 250: Performed by: NURSE ANESTHETIST, CERTIFIED REGISTERED

## 2022-11-09 PROCEDURE — 250N000011 HC RX IP 250 OP 636: Performed by: NURSE ANESTHETIST, CERTIFIED REGISTERED

## 2022-11-09 PROCEDURE — 45380 COLONOSCOPY AND BIOPSY: CPT | Mod: PT | Performed by: SURGERY

## 2022-11-09 PROCEDURE — 88305 TISSUE EXAM BY PATHOLOGIST: CPT

## 2022-11-09 PROCEDURE — 45380 COLONOSCOPY AND BIOPSY: CPT | Performed by: NURSE ANESTHETIST, CERTIFIED REGISTERED

## 2022-11-09 PROCEDURE — 45380 COLONOSCOPY AND BIOPSY: CPT | Performed by: SURGERY

## 2022-11-09 PROCEDURE — 999N000010 HC STATISTIC ANES STAT CODE-CRNA PER MINUTE: Performed by: SURGERY

## 2022-11-09 PROCEDURE — 258N000003 HC RX IP 258 OP 636: Performed by: SURGERY

## 2022-11-09 RX ORDER — LIDOCAINE 40 MG/G
CREAM TOPICAL
Status: DISCONTINUED | OUTPATIENT
Start: 2022-11-09 | End: 2022-11-09 | Stop reason: HOSPADM

## 2022-11-09 RX ORDER — LIDOCAINE HYDROCHLORIDE 20 MG/ML
INJECTION, SOLUTION INFILTRATION; PERINEURAL PRN
Status: DISCONTINUED | OUTPATIENT
Start: 2022-11-09 | End: 2022-11-09

## 2022-11-09 RX ORDER — ONDANSETRON 2 MG/ML
4 INJECTION INTRAMUSCULAR; INTRAVENOUS EVERY 6 HOURS PRN
Status: DISCONTINUED | OUTPATIENT
Start: 2022-11-09 | End: 2022-11-09 | Stop reason: HOSPADM

## 2022-11-09 RX ORDER — PROCHLORPERAZINE MALEATE 5 MG
10 TABLET ORAL EVERY 6 HOURS PRN
Status: DISCONTINUED | OUTPATIENT
Start: 2022-11-09 | End: 2022-11-09 | Stop reason: HOSPADM

## 2022-11-09 RX ORDER — NALOXONE HYDROCHLORIDE 0.4 MG/ML
0.2 INJECTION, SOLUTION INTRAMUSCULAR; INTRAVENOUS; SUBCUTANEOUS
Status: DISCONTINUED | OUTPATIENT
Start: 2022-11-09 | End: 2022-11-09 | Stop reason: HOSPADM

## 2022-11-09 RX ORDER — ONDANSETRON 4 MG/1
4 TABLET, ORALLY DISINTEGRATING ORAL EVERY 6 HOURS PRN
Status: DISCONTINUED | OUTPATIENT
Start: 2022-11-09 | End: 2022-11-09 | Stop reason: HOSPADM

## 2022-11-09 RX ORDER — ONDANSETRON 2 MG/ML
4 INJECTION INTRAMUSCULAR; INTRAVENOUS
Status: DISCONTINUED | OUTPATIENT
Start: 2022-11-09 | End: 2022-11-09 | Stop reason: HOSPADM

## 2022-11-09 RX ORDER — PROPOFOL 10 MG/ML
INJECTION, EMULSION INTRAVENOUS CONTINUOUS PRN
Status: DISCONTINUED | OUTPATIENT
Start: 2022-11-09 | End: 2022-11-09

## 2022-11-09 RX ORDER — PROPOFOL 10 MG/ML
INJECTION, EMULSION INTRAVENOUS PRN
Status: DISCONTINUED | OUTPATIENT
Start: 2022-11-09 | End: 2022-11-09

## 2022-11-09 RX ORDER — FLUMAZENIL 0.1 MG/ML
0.2 INJECTION, SOLUTION INTRAVENOUS
Status: DISCONTINUED | OUTPATIENT
Start: 2022-11-09 | End: 2022-11-09 | Stop reason: HOSPADM

## 2022-11-09 RX ORDER — POLYETHYLENE GLYCOL 3350, SODIUM CHLORIDE, SODIUM BICARBONATE, POTASSIUM CHLORIDE 420; 11.2; 5.72; 1.48 G/4L; G/4L; G/4L; G/4L
420 POWDER, FOR SOLUTION ORAL ONCE
Status: ON HOLD | COMMUNITY
Start: 2022-08-18 | End: 2022-11-09

## 2022-11-09 RX ORDER — NALOXONE HYDROCHLORIDE 0.4 MG/ML
0.4 INJECTION, SOLUTION INTRAMUSCULAR; INTRAVENOUS; SUBCUTANEOUS
Status: DISCONTINUED | OUTPATIENT
Start: 2022-11-09 | End: 2022-11-09 | Stop reason: HOSPADM

## 2022-11-09 RX ORDER — SODIUM CHLORIDE, SODIUM LACTATE, POTASSIUM CHLORIDE, CALCIUM CHLORIDE 600; 310; 30; 20 MG/100ML; MG/100ML; MG/100ML; MG/100ML
INJECTION, SOLUTION INTRAVENOUS CONTINUOUS
Status: DISCONTINUED | OUTPATIENT
Start: 2022-11-09 | End: 2022-11-09 | Stop reason: HOSPADM

## 2022-11-09 RX ADMIN — SODIUM CHLORIDE, POTASSIUM CHLORIDE, SODIUM LACTATE AND CALCIUM CHLORIDE: 600; 310; 30; 20 INJECTION, SOLUTION INTRAVENOUS at 07:53

## 2022-11-09 RX ADMIN — LIDOCAINE HYDROCHLORIDE 40 MG: 20 INJECTION, SOLUTION INFILTRATION; PERINEURAL at 08:03

## 2022-11-09 RX ADMIN — PROPOFOL 135 MCG/KG/MIN: 10 INJECTION, EMULSION INTRAVENOUS at 08:03

## 2022-11-09 RX ADMIN — PROPOFOL 100 MG: 10 INJECTION, EMULSION INTRAVENOUS at 08:03

## 2022-11-09 ASSESSMENT — ACTIVITIES OF DAILY LIVING (ADL): ADLS_ACUITY_SCORE: 35

## 2022-11-09 NOTE — OR NURSING
patient has been discharged to home at 0940 via ambulation accompanied by     Written discharge instructions were provided to patient.  Prescriptions were none.      Patient and adult caring for them verbalize understanding of discharge instructions including no driving until tomorrow and no longer taking narcotic pain medications - no operating mechanical equipment and no making any important decisions.They understand reason for discharge, and necessary follow-up appointments.

## 2022-11-09 NOTE — ANESTHESIA CARE TRANSFER NOTE
Patient: Celio Yap    Procedure: Procedure(s):  COLONOSCOPY, WITH POLYPECTOMY AND BIOPSY       Diagnosis: Encounter for screening colonoscopy [Z12.11]  Diagnosis Additional Information: No value filed.    Anesthesia Type:   MAC     Note:    Oropharynx: oropharynx clear of all foreign objects  Level of Consciousness: awake  Oxygen Supplementation: room air    Independent Airway: airway patency satisfactory and stable  Dentition: dentition unchanged  Vital Signs Stable: post-procedure vital signs reviewed and stable  Report to RN Given: handoff report given  Patient transferred to: Phase II    Handoff Report: Identifed the Patient, Identified the Reponsible Provider, Reviewed the pertinent medical history, Discussed the surgical course, Reviewed Intra-OP anesthesia mangement and issues during anesthesia, Set expectations for post-procedure period and Allowed opportunity for questions and acknowledgement of understanding      Vitals:  Vitals Value Taken Time   BP     Temp     Pulse     Resp     SpO2         Electronically Signed By: JANETTE MONTGOMERY CRNA  November 9, 2022  8:29 AM

## 2022-11-09 NOTE — ANESTHESIA PREPROCEDURE EVALUATION
Anesthesia Pre-Procedure Evaluation    Patient: Celio Yap   MRN: [id number] : 1960        Procedure : Procedure(s):  COLONOSCOPY          Past Medical History:   Diagnosis Date     Asymptomatic menopausal state     No Comments Provided     Personal history of other medical treatment (CODE)     ,  X2      Past Surgical History:   Procedure Laterality Date     ENDOSCOPIC STRIPPING VEIN(S)      Varicose vein laser procedures     LAPAROSCOPIC ABLATION ENDOMETRIOSIS      , Endometrial ablation     OTHER SURGICAL HISTORY      63327.0,XR BREAST STEREOTACTIC WIRE LOCAL ([place])     OTHER SURGICAL HISTORY      [id number],LUMBAR DISKECTOMY      Allergies   Allergen Reactions     Seasonal Allergies      Extract Of Poison Ivy Rash and Blisters      Social History     Tobacco Use     Smoking status: Never     Smokeless tobacco: Never   Substance Use Topics     Alcohol use: Yes     Alcohol/week: 0.0 standard drinks     Comment: Alcoholic Drinks/day: one glass of wine per lucila      Wt Readings from Last 1 Encounters:   22 70.9 kg (156 lb 6.4 oz)        Anesthesia Evaluation   Pt has had prior anesthetic.     No history of anesthetic complications       ROS/MED HX  ENT/Pulmonary:  - neg pulmonary ROS     Neurologic:  - neg neurologic ROS     Cardiovascular:  - neg cardiovascular ROS     METS/Exercise Tolerance: >4 METS    Hematologic:  - neg hematologic  ROS     Musculoskeletal:  - neg musculoskeletal ROS     GI/Hepatic:  - neg GI/hepatic ROS     Renal/Genitourinary:  - neg Renal ROS     Endo:  - neg endo ROS     Psychiatric/Substance Use:  - neg psychiatric ROS     Infectious Disease:  - neg infectious disease ROS     Malignancy:  - neg malignancy ROS     Other:  - neg other ROS          Physical Exam    Airway  airway exam normal      Mallampati: II   TM distance: > 3 FB   Neck ROM: full   Mouth opening: > 3 cm    Respiratory Devices and Support         Dental  no notable dental history          Cardiovascular   cardiovascular exam normal       Rhythm and rate: regular and normal     Pulmonary   pulmonary exam normal        breath sounds clear to auscultation           OUTSIDE LABS:  CBC:   Lab Results   Component Value Date    WBC 5.1 07/18/2022    HGB 15.1 07/18/2022    HCT 45.6 07/18/2022     07/18/2022     BMP:   Lab Results   Component Value Date     07/18/2022     03/10/2017    POTASSIUM 3.9 07/18/2022    POTASSIUM 3.9 03/10/2017    CHLORIDE 107 07/18/2022    CHLORIDE 103 03/10/2017    CO2 26 07/18/2022    CO2 27 03/10/2017    BUN 17 07/18/2022    BUN 18 03/10/2017    CR 0.82 07/18/2022    CR 0.75 03/10/2017    GLC 95 07/18/2022    GLC 91 03/10/2017     COAGS: No results found for: PTT, INR, FIBR  POC: No results found for: BGM, HCG, HCGS  HEPATIC:   Lab Results   Component Value Date    ALBUMIN 4.3 07/18/2022    PROTTOTAL 6.8 07/18/2022    ALT 8 07/18/2022    AST 19 07/18/2022    ALKPHOS 44 07/18/2022    BILITOTAL 1.4 (H) 07/18/2022     OTHER:   Lab Results   Component Value Date    PATRICIA 9.2 07/18/2022    TSH 3.44 07/18/2022       Anesthesia Plan    ASA Status:  1   NPO Status:  NPO Appropriate    Anesthesia Type: MAC.   Induction: Propofol.   Maintenance: Balanced.        Consents    Anesthesia Plan(s) and associated risks, benefits, and realistic alternatives discussed. Questions answered and patient/representative(s) expressed understanding.     - Discussed: Risks, Benefits and Alternatives for BOTH SEDATION and the PROCEDURE were discussed     - Discussed with:  Patient      - Extended Intubation/Ventilatory Support Discussed: No.      - Patient is DNR/DNI Status: No    Use of blood products discussed: Yes.     - Discussed with: Patient.     - Consented: consented to blood products            Reason for refusal: other.     Postoperative Care            Comments:                JANETTE MONTGOMERY CRNA

## 2022-11-09 NOTE — DISCHARGE INSTRUCTIONS
Procedure you had done: colonoscopy with removal of tiny polyp and biopsy  Your health care provider is:  Sasha Bethea  Your surgeon is Dr. Leighann Whitaker.   Please call your health care provider or surgeon at [phone] if:    - you feel you are getting worse or having an increase in problems    - fever greater than 101 degrees  - increasing shortness of breath or chest pain  - any signs of infection (increasing redness, swelling, tenderness, warmth, change in appearance, or  increased drainage)  - blood in your urine or stool  - coughing or vomiting blood  - nausea (upset stomach) and vomiting and/or diarrhea that will not stop  - severe pain that is not relieved by medicine, rest or ice  You have had medications for sedation. Please be aware that this can cause drowsiness and impaired judgment for up to 24 hours after your procedure. Do not drive, operate power tools or drink alcohol for 24 hours.  If samples were taken-you will get a phone call and a letter with your results in the next 7-10 days. If you don't get results, please call and let us know!

## 2022-11-09 NOTE — H&P
PRE-PROCEDURE NOTE    CHIEF COMPLAINT / REASON FORPROCEDURE:  Need for screening colonoscopy.    PERTINENT HISTORY   Patient with no complaints. No polyps on previous colonoscopy. No diarrhea, constipation, abdominal pain or rectal bleeding. No family history of colon polyps or colon cancer.  Past Medical History:   Diagnosis Date     Asymptomatic menopausal state     No Comments Provided     Personal history of other medical treatment (CODE)     ,  X2     Past Surgical History:   Procedure Laterality Date     ENDOSCOPIC STRIPPING VEIN(S)      Varicose vein laser procedures     LAPAROSCOPIC ABLATION ENDOMETRIOSIS      , Endometrial ablation     OTHER SURGICAL HISTORY      18077.0,XR BREAST STEREOTACTIC WIRE LOCAL ([place])     OTHER SURGICAL HISTORY      [id number],LUMBAR DISKECTOMY     Other:  None  Bleeding tendencies:  No    Relevant Family History:  none    Relevant Social History:  none    A relevant review of systems was performed and was Negative.    ALLERGIES/SENSITIVITIES:   Allergies   Allergen Reactions     Seasonal Allergies      Extract Of Poison Ivy Rash and Blisters        CURRENTMEDICATIONS:    No current facility-administered medications on file prior to encounter.  No current outpatient medications on file prior to encounter.    Current Facility-Administered Medications   Medication     lactated ringers infusion     lidocaine (LMX4) cream     lidocaine 1 % 0.1-1 mL     ondansetron (ZOFRAN) injection 4 mg     sodium chloride (PF) 0.9% PF flush 3 mL     sodium chloride (PF) 0.9% PF flush 3 mL       PRE-SEDATION ASSESSMENT:    /83   Pulse 65   Temp 97.9  F (36.6  C) (Tympanic)   Resp 14   Wt 70.8 kg (156 lb)   SpO2 95%   BMI 24.99 kg/m    Lung Exam:  Normal  Heart Exam:  Normal    Comment(s):      IMPRESSION:  Need for screening colonoscopy.    PLAN:  I discussed screening colonoscopy with the patient.

## 2022-11-09 NOTE — ANESTHESIA POSTPROCEDURE EVALUATION
Patient: Celio Yap    Procedure: Procedure(s):  COLONOSCOPY, WITH POLYPECTOMY AND BIOPSY       Anesthesia Type:  MAC    Note:  Disposition: Outpatient   Postop Pain Control: Uneventful            Sign Out: Well controlled pain   PONV: No   Neuro/Psych: Uneventful            Sign Out: Acceptable/Baseline neuro status   Airway/Respiratory: Uneventful            Sign Out: Acceptable/Baseline resp. status   CV/Hemodynamics: Uneventful            Sign Out: Acceptable CV status   Other NRE: NONE   DID A NON-ROUTINE EVENT OCCUR? No           Last vitals:  Vitals Value Taken Time   /65 11/09/22 0830   Temp 97.2  F (36.2  C) 11/09/22 0828   Pulse 57 11/09/22 0830   Resp     SpO2 94 % 11/09/22 0832   Vitals shown include unvalidated device data.    Electronically Signed By: JANETTE HEART CRNA  November 9, 2022  8:34 AM

## 2022-11-09 NOTE — OP NOTE
PROCEDURE NOTE    SURGEON: Leighann Guo MD.    PRE-OP DIAGNOSIS:  Screening Colonoscopy      POST-OP DIAGNOSIS: colon polyp, mild patchy sigmoid irritation     Location: Ascending colon Size: 0.2 cm  Removed:  Y    PROCEDURE:  Colonoscopy with polypectomy cold forceps, sigmoid biopsies    ESTIMATEDBLOOD LOSS: none    COMPLICATIONS:  None    SPECIMEN:  Ascending colon polyp, sigmoid biopsy    ANESTHESIA:  See anesthesia note    INDICATION FOR THE PROCEDURE: The patient is a 62 year old female. The patient has no complaints. I explained to the patient the risks, benefits and alternatives to screening colonoscopy for evaluating the colon for colon polyps and colon cancer. We specifically discussed the risks of bleeding, infection, perforation, potential inability to reach the cecum and the risks of sedation. The patient's questions were answered and the patient wished to proceed. Informed consent paperwork was completed.    PROCEDURE: The patient was taken to the endoscopy suite. Appropriate monitors were attached. The patient was placed in the left lateral decubitus position.Timeout was performed confirming the patient's identity and procedure to be performed. After appropriate sedation was confirmed, digital rectal exam was performed. There was normal tone and no gross abnormality was noted. The lubricated colonoscope was introduced into the anus the colon was insufflated with air. The prep quality was adequate. Under direct visualization the scope was advanced to the cecum. The ileocecal valve was intubated and the terminal ileum inspected. No gross abnormality was noted. The scope was withdrawn back into the cecum. The mucosa of colon was inspected while withdrawing the scope. A tiny flat polyp was noted in the ascending colon and removed with cold forceps. Patchy mild irritation was noted in the sigmoid colon and biopsy obtained. The scope was retroflexed in the rectum and the anorectal junction was inspected. No  abnormalities were noted. The scope was returned to a neutral position and the colon was decompressed. The scope was removed. The patient tolerated the procedure with no immediately apparent complication. The patient was taken to recovery in stable condition.  FOLLOW UP:  RECOMMEND high fiber diet, follow up: will call with pathology results.

## 2022-11-10 LAB
PATH REPORT.COMMENTS IMP SPEC: NORMAL
PATH REPORT.FINAL DX SPEC: NORMAL
PATH REPORT.RELEVANT HX SPEC: NORMAL
PHOTO IMAGE: NORMAL

## 2022-12-12 ENCOUNTER — TELEPHONE (OUTPATIENT)
Dept: FAMILY MEDICINE | Facility: OTHER | Age: 62
End: 2022-12-12

## 2022-12-12 NOTE — TELEPHONE ENCOUNTER
"Called and spoke to Patient after verifying last name and date of birth.  Explained to her that I was calling [facility] she mentioned that she was having chest pain. I asked if she was having them now? Patient stated \"No and that this is just more of a check up visit being she has been having these issues for a while now and it's not emergent or anything like that\" Patient stated that she is fine waiting till her appointment on the 14 th with Skylar Bazan. I did inform patient that if symptoms (chest pain/tighteness, SOB, radiating pain) get worse to go to the ED. Patient verbalized understanding.     Marilou Gillette RN on 12/12/2022 at 10:32 AM    "

## 2022-12-12 NOTE — TELEPHONE ENCOUNTER
Call center called and informed me that she was going over appointment notes and noticed patient stated heart health issues with complaints of chest pain and jaw pain.     Called and left message for patient to call back.      Marilou Gillette RN on 12/12/2022 at 9:28 AM

## 2022-12-14 ENCOUNTER — OFFICE VISIT (OUTPATIENT)
Dept: FAMILY MEDICINE | Facility: OTHER | Age: 62
End: 2022-12-14
Attending: FAMILY MEDICINE
Payer: OTHER GOVERNMENT

## 2022-12-14 ENCOUNTER — HOSPITAL ENCOUNTER (OUTPATIENT)
Dept: GENERAL RADIOLOGY | Facility: OTHER | Age: 62
Discharge: HOME | End: 2022-12-14
Attending: NURSE PRACTITIONER
Payer: OTHER GOVERNMENT

## 2022-12-14 VITALS
RESPIRATION RATE: 16 BRPM | TEMPERATURE: 97.5 F | WEIGHT: 159.2 LBS | DIASTOLIC BLOOD PRESSURE: 78 MMHG | OXYGEN SATURATION: 98 % | SYSTOLIC BLOOD PRESSURE: 110 MMHG | BODY MASS INDEX: 25.58 KG/M2 | HEIGHT: 66 IN | HEART RATE: 68 BPM

## 2022-12-14 DIAGNOSIS — R07.9 CHEST PAIN, UNSPECIFIED TYPE: Primary | ICD-10-CM

## 2022-12-14 DIAGNOSIS — R07.9 CHEST PAIN, UNSPECIFIED TYPE: ICD-10-CM

## 2022-12-14 LAB
ALBUMIN SERPL BCG-MCNC: 4.6 G/DL (ref 3.5–5.2)
ALP SERPL-CCNC: 70 U/L (ref 35–104)
ALT SERPL W P-5'-P-CCNC: 9 U/L (ref 10–35)
ANION GAP SERPL CALCULATED.3IONS-SCNC: 10 MMOL/L (ref 7–15)
AST SERPL W P-5'-P-CCNC: 22 U/L (ref 10–35)
BASOPHILS # BLD AUTO: 0.1 10E3/UL (ref 0–0.2)
BASOPHILS NFR BLD AUTO: 1 %
BILIRUB SERPL-MCNC: 0.9 MG/DL
BUN SERPL-MCNC: 15.6 MG/DL (ref 8–23)
CALCIUM SERPL-MCNC: 9.3 MG/DL (ref 8.8–10.2)
CHLORIDE SERPL-SCNC: 104 MMOL/L (ref 98–107)
CREAT SERPL-MCNC: 0.78 MG/DL (ref 0.51–0.95)
D DIMER PPP FEU-MCNC: 0.61 UG/ML FEU (ref 0–0.5)
DEPRECATED HCO3 PLAS-SCNC: 26 MMOL/L (ref 22–29)
EOSINOPHIL # BLD AUTO: 0.1 10E3/UL (ref 0–0.7)
EOSINOPHIL NFR BLD AUTO: 1 %
ERYTHROCYTE [DISTWIDTH] IN BLOOD BY AUTOMATED COUNT: 14.7 % (ref 10–15)
GFR SERPL CREATININE-BSD FRML MDRD: 85 ML/MIN/1.73M2
GLUCOSE SERPL-MCNC: 97 MG/DL (ref 70–99)
HCT VFR BLD AUTO: 46.4 % (ref 35–47)
HGB BLD-MCNC: 15.4 G/DL (ref 11.7–15.7)
IMM GRANULOCYTES # BLD: 0 10E3/UL
IMM GRANULOCYTES NFR BLD: 1 %
LYMPHOCYTES # BLD AUTO: 2.5 10E3/UL (ref 0.8–5.3)
LYMPHOCYTES NFR BLD AUTO: 49 %
MCH RBC QN AUTO: 29.8 PG (ref 26.5–33)
MCHC RBC AUTO-ENTMCNC: 33.2 G/DL (ref 31.5–36.5)
MCV RBC AUTO: 90 FL (ref 78–100)
MONOCYTES # BLD AUTO: 0.4 10E3/UL (ref 0–1.3)
MONOCYTES NFR BLD AUTO: 9 %
NEUTROPHILS # BLD AUTO: 2 10E3/UL (ref 1.6–8.3)
NEUTROPHILS NFR BLD AUTO: 39 %
NRBC # BLD AUTO: 0 10E3/UL
NRBC BLD AUTO-RTO: 0 /100
PLATELET # BLD AUTO: 202 10E3/UL (ref 150–450)
POTASSIUM SERPL-SCNC: 4.1 MMOL/L (ref 3.4–5.3)
PROT SERPL-MCNC: 7.3 G/DL (ref 6.4–8.3)
RBC # BLD AUTO: 5.17 10E6/UL (ref 3.8–5.2)
SODIUM SERPL-SCNC: 140 MMOL/L (ref 136–145)
TROPONIN T SERPL HS-MCNC: 8 NG/L
TSH SERPL DL<=0.005 MIU/L-ACNC: 3.29 UIU/ML (ref 0.3–4.2)
WBC # BLD AUTO: 5 10E3/UL (ref 4–11)

## 2022-12-14 PROCEDURE — 99214 OFFICE O/P EST MOD 30 MIN: CPT | Performed by: NURSE PRACTITIONER

## 2022-12-14 PROCEDURE — 84484 ASSAY OF TROPONIN QUANT: CPT | Mod: ZL | Performed by: NURSE PRACTITIONER

## 2022-12-14 PROCEDURE — 71046 X-RAY EXAM CHEST 2 VIEWS: CPT

## 2022-12-14 PROCEDURE — 93000 ELECTROCARDIOGRAM COMPLETE: CPT | Performed by: INTERNAL MEDICINE

## 2022-12-14 PROCEDURE — 85025 COMPLETE CBC W/AUTO DIFF WBC: CPT | Mod: ZL | Performed by: NURSE PRACTITIONER

## 2022-12-14 PROCEDURE — 36415 COLL VENOUS BLD VENIPUNCTURE: CPT | Mod: ZL | Performed by: NURSE PRACTITIONER

## 2022-12-14 PROCEDURE — 80053 COMPREHEN METABOLIC PANEL: CPT | Mod: ZL | Performed by: NURSE PRACTITIONER

## 2022-12-14 PROCEDURE — 85379 FIBRIN DEGRADATION QUANT: CPT | Mod: ZL | Performed by: NURSE PRACTITIONER

## 2022-12-14 PROCEDURE — 84443 ASSAY THYROID STIM HORMONE: CPT | Mod: ZL | Performed by: NURSE PRACTITIONER

## 2022-12-14 ASSESSMENT — PAIN SCALES - GENERAL: PAINLEVEL: NO PAIN (0)

## 2022-12-14 NOTE — PROGRESS NOTES
Assessment & Plan   Problem List Items Addressed This Visit    None  Visit Diagnoses     Chest pain, unspecified type    -  Primary    Relevant Orders    EKG 12-lead, tracing only (Completed)    TSH Reflex GH (Completed)    CBC and Differential (Completed)    Comprehensive Metabolic Panel (Completed)    XR Chest 2 Views (Completed)    Troponin T, High Sensitivity (Completed)    D dimer quantitative (Completed)    Exercise Stress Test - Adult      Nonspecific chest pain that has been occurring more frequently.  EKG, chest x-ray and labs were obtained.  EKG and chest x-ray stable.  Labs are stable.  Slightly elevated D-dimer.  Exercise stress test was ordered.  Recommend if she has a recurrent episode of chest pain she should present to the emergency room for acute evaluation.    Review of the result(s) of each unique test - CXR, EKG, labs  Ordering of each unique test  32 minutes spent on the date of the encounter doing chart review, history and exam, documentation and further activities per the note           No follow-ups on file.    JANETTE Garza [facility] \[facility]\""    Jackie Pickens is a 62 year old, presenting for the following health issues:  Chest Pain      History of Present Illness       Reason for visit:  Heart health evaluation. Have discussed this with my Dr. before, but have had more frequent episodes and feel it s time to get serious.    She eats 2-3 servings of fruits and vegetables daily.She consumes 0 sweetened beverage(s) daily.She exercises with enough effort to increase her heart rate 9 or less minutes per day.  She exercises with enough effort to increase her heart rate 3 or less days per week.   She is taking medications regularly.     She is being evaluated today for chest pain and concerned about cardiac issues.  She reports she had 1 episode in 2018 with severe chest tightness that radiated up into her jaw.  She went approximately 2 years without this happening  "again.  Over this past year this been happening more frequently and last week she had 2 separate episodes.  She will note the pain starts in the bottom of her jaw, radiates up around into her head and then quickly goes to her lower rib cage and feels like somebody is tightening a belt around her chest.  Episodes last approximately 20 to 25 minutes.  She does not have any shortness of breath, no syncopal episodes.  She does not feel like her heart is racing.  Does not check her blood pressures at home.  Does not seem to be food related.  She does not get any dizziness.  This is only happen when she is at rest.  When episodes occur she will work on a self calming.  Does not try any over-the-counter medications.  Denies any history of reflux or heartburn.  No personal cardiac history, no family cardiac history.    Review of Systems   See above      Objective    /78   Pulse 68   Temp 97.5  F (36.4  C)   Resp 16   Ht 1.683 m (5' 6.25\")   Wt 72.2 kg (159 lb 3.2 oz)   SpO2 98%   BMI 25.50 kg/m    Body mass index is 25.5 kg/m .  Physical Exam   GENERAL: healthy, alert and no distress  EYES: Eyes grossly normal to inspection  NECK: no adenopathy, no asymmetry, masses, or scars and thyroid normal to palpation.  No carotid bruit  RESP: lungs clear to auscultation - no rales, rhonchi or wheezes  CV: regular rate and rhythm, normal S1 S2, no S3 or S4, no murmur, click or rub, no peripheral edema and peripheral pulses strong  NEURO: Normal strength and tone, mentation intact and speech normal  PSYCH: mentation appears normal, affect normal/bright    Results for orders placed or performed in visit on 12/14/22 (from the past 24 hour(s))   TSH Reflex GH   Result Value Ref Range    TSH 3.29 0.30 - 4.20 uIU/mL   CBC and Differential    Narrative    The following orders were created for panel order CBC and Differential.  Procedure                               Abnormality         Status                     ---------        "                        -----------         ------                     CBC with platelets and d...[[id number]]                      Final result                 Please view results for these tests on the individual orders.   Comprehensive Metabolic Panel   Result Value Ref Range    Sodium 140 136 - 145 mmol/L    Potassium 4.1 3.4 - 5.3 mmol/L    Chloride 104 98 - 107 mmol/L    Carbon Dioxide (CO2) 26 22 - 29 mmol/L    Anion Gap 10 7 - 15 mmol/L    Urea Nitrogen 15.6 8.0 - 23.0 mg/dL    Creatinine 0.78 0.51 - 0.95 mg/dL    Calcium 9.3 8.8 - 10.2 mg/dL    Glucose 97 70 - 99 mg/dL    Alkaline Phosphatase 70 35 - 104 U/L    AST 22 10 - 35 U/L    ALT 9 (L) 10 - 35 U/L    Protein Total 7.3 6.4 - 8.3 g/dL    Albumin 4.6 3.5 - 5.2 g/dL    Bilirubin Total 0.9 <=1.2 mg/dL    GFR Estimate 85 >60 mL/min/1.73m2   Troponin T, High Sensitivity   Result Value Ref Range    Troponin T, High Sensitivity 8 <=14 ng/L   D dimer quantitative   Result Value Ref Range    D-Dimer Quantitative 0.61 (H) 0.00 - 0.50 ug/mL FEU    Narrative    This D-dimer assay is intended for use in conjunction with a clinical pretest probability assessment model to exclude pulmonary embolism (PE) and deep venous thrombosis (DVT) in outpatients suspected of PE or DVT. The cut-off value is 0.50 ug/mL FEU.   CBC with platelets and differential   Result Value Ref Range    WBC Count 5.0 4.0 - 11.0 10e3/uL    RBC Count 5.17 3.80 - 5.20 10e6/uL    Hemoglobin 15.4 11.7 - 15.7 g/dL    Hematocrit 46.4 35.0 - 47.0 %    MCV 90 78 - 100 fL    MCH 29.8 26.5 - 33.0 pg    MCHC 33.2 31.5 - 36.5 g/dL    RDW 14.7 10.0 - 15.0 %    Platelet Count 202 150 - 450 10e3/uL    % Neutrophils 39 %    % Lymphocytes 49 %    % Monocytes 9 %    % Eosinophils 1 %    % Basophils 1 %    % Immature Granulocytes 1 %    NRBCs per 100 WBC 0 <1 /100    Absolute Neutrophils 2.0 1.6 - 8.3 10e3/uL    Absolute Lymphocytes 2.5 0.8 - 5.3 10e3/uL    Absolute Monocytes 0.4 0.0 - 1.3 10e3/uL    Absolute  Eosinophils 0.1 0.0 - 0.7 10e3/uL    Absolute Basophils 0.1 0.0 - 0.2 10e3/uL    Absolute Immature Granulocytes 0.0 <=0.4 10e3/uL    Absolute NRBCs 0.0 10e3/uL   EKG 12-lead, tracing only   Result Value Ref Range    Systolic Blood Pressure  mmHg    Diastolic Blood Pressure  mmHg    Ventricular Rate 56 BPM    Atrial Rate 56 BPM    DE Interval 142 ms    QRS Duration 86 ms     ms    QTc 440 ms    P Axis 42 degrees    R AXIS -24 degrees    T Axis -6 degrees    Interpretation ECG       Sinus bradycardia  Otherwise normal ECG  No previous ECGs available

## 2022-12-14 NOTE — NURSING NOTE
Patient presents today for chest tightness that has been happening on and off over the last 3 years but has worsened over the last month, happening more frequently.    Medication Reconciliation Complete    Tori Hutchison LPN  12/14/2022 9:10 AM

## 2022-12-15 LAB
ATRIAL RATE - MUSE: 56 BPM
DIASTOLIC BLOOD PRESSURE - MUSE: NORMAL MMHG
INTERPRETATION ECG - MUSE: NORMAL
P AXIS - MUSE: 42 DEGREES
PR INTERVAL - MUSE: 142 MS
QRS DURATION - MUSE: 86 MS
QT - MUSE: 456 MS
QTC - MUSE: 440 MS
R AXIS - MUSE: -24 DEGREES
SYSTOLIC BLOOD PRESSURE - MUSE: NORMAL MMHG
T AXIS - MUSE: -6 DEGREES
VENTRICULAR RATE- MUSE: 56 BPM

## 2023-01-10 ENCOUNTER — TELEPHONE (OUTPATIENT)
Dept: CARDIOLOGY | Facility: OTHER | Age: 63
End: 2023-01-10

## 2023-01-12 ENCOUNTER — HOSPITAL ENCOUNTER (OUTPATIENT)
Dept: CARDIOLOGY | Facility: OTHER | Age: 63
Discharge: HOME | End: 2023-01-12
Attending: NURSE PRACTITIONER | Admitting: NURSE PRACTITIONER
Payer: OTHER GOVERNMENT

## 2023-01-12 DIAGNOSIS — R07.9 CHEST PAIN, UNSPECIFIED TYPE: ICD-10-CM

## 2023-01-12 LAB — STRESS ECHO TARGET HR: 157

## 2023-01-12 PROCEDURE — 93016 CV STRESS TEST SUPVJ ONLY: CPT | Performed by: STUDENT IN AN ORGANIZED HEALTH CARE EDUCATION/TRAINING PROGRAM

## 2023-01-12 PROCEDURE — 93017 CV STRESS TEST TRACING ONLY: CPT

## 2023-01-12 PROCEDURE — 93018 CV STRESS TEST I&R ONLY: CPT | Performed by: STUDENT IN AN ORGANIZED HEALTH CARE EDUCATION/TRAINING PROGRAM

## 2023-01-12 NOTE — PROGRESS NOTES
1230 Patient arrived for an exercise stress test. The procedure was explained, medications, allergies and history reviewed. The patient was prepped for the stress test. Dr. Shaikh arrived, and the patient walked 8 minutes and 7 seconds. The patient tolerated the procedure. The patient left in stable condition. The patient was instructed that the ordering MD will call within one to two days with test results. Please see the test results, for a full report.

## 2023-04-14 NOTE — PROGRESS NOTES
Assessment & Plan     1. Chronic left-sided thoracic back pain  Left-sided thoracic back pain since a jarring injury while slipping on ice in 11/2022.  X-ray showing mild degenerative changes.  Discussed options including physical therapy, chiropractic therapy, MRI, etc.  Patient requesting referrals with both chiropractic and physical therapy.  If symptoms persist or worsen follow-up for additional evaluation in the clinic.  Okay to use over-the-counter analgesics, icing, heating as needed.  - XR Thoracic Spine 2 Views; Future  - Chiropractic Referral; Future  - Physical Therapy Referral; Future    2. Chronic pain of right wrist  Differential includes extensor pollicis longus or brevis tendinitis, de Quervain's tenosynovitis, underlying arthritis, etc.  X-ray completed showing severe degenerative change in the STT joint there are degenerative changes first CMC joint likely causing chronic irritation to bone as well as tendons.  Discussed options, patient requesting referral to sports medicine for consideration of possible cortisone injection.  - XR Wrist Right G/E 3 Views; Future  - Orthopedic  Referral; Future        Return if symptoms worsen or fail to improve.    Kathi Morse PA-C  [facility]    Subjective   Celio is a 62 year old, presenting for the following health issues:  Musculoskeletal Problem      History of Present Illness       Back Pain:  She presents for follow up of back pain. Patient's back pain is a chronic problem.  Location of back pain:  Left lower back, left middle of back and right shoulder  Description of back pain: cramping and shooting  Back pain spreads: nowhere    Since patient first noticed back pain, pain is: always present, but gets better and worse  Does back pain interfere with her job:  Yes      Reason for visit:  Back and wrist pain  Symptom onset:  More than a month  Symptoms include:  BacK-pain and spasms,wrist pain  Symptom intensity:   Severe  Symptom progression:  Worsening  Had these symptoms before:  Yes  Has tried/received treatment for these symptoms:  No  What makes it worse:  Use  What makes it better:  Cold, heat and stretching    She eats 2-3 servings of fruits and vegetables daily.She consumes 1 sweetened beverage(s) daily.She exercises with enough effort to increase her heart rate 20 to 29 minutes per day.  She exercises with enough effort to increase her heart rate 3 or less days per week.   She is taking medications regularly.     Here for evaluation of multiple concerns.     Back:  Patient has been struggling with left-sided lower thoracic back pain since she slipped on ice in 2022.  She did not fall rather she slipped and put a lot of pressure on her leg and twisted.  She has struggled with on and off left-sided back pain since.  She struggles with putting any pressure over the area, laying on that side at night.  She manages with ibuprofen at night as needed.  Has not taken anything else for symptomatic management.  No radiation to upper extremities, lower extremities, overlying skin changes, numbness or tingling.    Wrist:  Has been struggling with right lateral wrist pain since May/2022.  Reports decreased range of motion in her wrist, right thumb.  Pain starts around right lateral wrist and radiates up the arm as well as down forearm to elbow.  No known injury to the area.  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  Pain sometimes seems to be better with putting pressure on the wrist.  She did try wearing a brace with minimal improvement. Is losing  strength on right.      PAST MEDICAL HISTORY:   Past Medical History:   Diagnosis Date     Asymptomatic menopausal state     No Comments Provided     Personal history of other medical treatment (CODE)     ,  X2       PAST SURGICAL HISTORY:   Past Surgical History:   Procedure Laterality Date      "COLONOSCOPY N/A 2022    serrated adenomas, f/u 3 years     ENDOSCOPIC STRIPPING VEIN(S)      Varicose vein laser procedures     LAPAROSCOPIC ABLATION ENDOMETRIOSIS      , Endometrial ablation     OTHER SURGICAL HISTORY      54441.0,XR BREAST STEREOTACTIC WIRE LOCAL ([place])     OTHER SURGICAL HISTORY      [id number],LUMBAR DISKECTOMY       FAMILY HISTORY:   Family History   Problem Relation Age of Onset     Other - See Comments Mother 72         of abdominal infection     Other - See Comments Father 25         in MVA     Breast Cancer Maternal Aunt         Cancer-breast       SOCIAL HISTORY:   Social History     Tobacco Use     Smoking status: Never     Smokeless tobacco: Never   Vaping Use     Vaping status: Never Used   Substance Use Topics     Alcohol use: Yes     Alcohol/week: 0.0 standard drinks of alcohol     Comment: Alcoholic Drinks/day: one glass of wine per lucila        Allergies   Allergen Reactions     Seasonal Allergies      Extract Of Poison Ivy Rash and Blisters     No current outpatient medications on file.     No current facility-administered medications for this visit.       Review of Systems   Per HPI        Objective    /72   Pulse 66   Temp 97.9  F (36.6  C)   Resp 14   Ht 1.683 m (5' 6.25\")   Wt 74.3 kg (163 lb 12.8 oz)   SpO2 99%   BMI 26.24 kg/m    Body mass index is 26.24 kg/m .  Physical Exam   General: Pleasant, in no apparent distress.  Musculoskeletal: Back is straight, minimal tenderness to palpation of paraspinal and paravertebral muscles, left ribs extending towards chest.  Tenderness palpation over right lateral wrist with decreased flexion and extension, inversion and eversion of right wrist compared to left.  Decreased  strength on right compared to left.  Full range of motion of fingers bilaterally.  Neurologic Exam: Normal gross motor, tone coordination and no visible tremor.  Skin: No jaundice, pallor, rashes, or lesions.  Psych: Appropriate mood and " affect.      Results for orders placed or performed during the hospital encounter of 04/17/23   XR Thoracic Spine 2 Views     Status: None    Narrative    PROCEDURE: XR THORACIC SPINE 2 VIEWS 4/17/2023 10:42 AM    HISTORY: Chronic left-sided thoracic back pain; Chronic left-sided  thoracic back pain    COMPARISONS: None.    TECHNIQUE: 2 views.    FINDINGS: There is an S-shaped scoliosis, convex to the left in the  upper and mid thoracic spine convex to the right in the lower thoracic  and upper lumbar region.    No acute compression fracture is seen. There is only very mild  degenerative change in the spine. There is no focal bone lesion.         Impression    IMPRESSION: S-shaped scoliosis.    LUIS MARTÍNEZ MD         SYSTEM ID:  [id number]   Results for orders placed or performed during the hospital encounter of 04/17/23   XR Wrist Right G/E 3 Views     Status: None    Narrative    PROCEDURE: XR WRIST RIGHT G/E 3 VIEWS 4/17/2023 10:42 AM    HISTORY: Chronic pain of right wrist; Chronic pain of right wrist    COMPARISONS: None.    TECHNIQUE: 3 views.    FINDINGS: There is severe degenerative change in the STT joint with  degenerative change in the first carpal metacarpal joint as well.    No fracture or dislocation is seen and there is no focal bone lesion.    There may be some mild calcification in the TFCC.         Impression    IMPRESSION: Degenerative change without acute abnormality.    LUIS MARTÍNEZ MD         SYSTEM ID:  [id number]

## 2023-04-17 ENCOUNTER — HOSPITAL ENCOUNTER (OUTPATIENT)
Dept: GENERAL RADIOLOGY | Facility: OTHER | Age: 63
Discharge: HOME | End: 2023-04-17
Attending: PHYSICIAN ASSISTANT
Payer: OTHER GOVERNMENT

## 2023-04-17 ENCOUNTER — OFFICE VISIT (OUTPATIENT)
Dept: FAMILY MEDICINE | Facility: OTHER | Age: 63
End: 2023-04-17
Attending: PHYSICIAN ASSISTANT
Payer: OTHER GOVERNMENT

## 2023-04-17 VITALS
RESPIRATION RATE: 14 BRPM | DIASTOLIC BLOOD PRESSURE: 72 MMHG | TEMPERATURE: 97.9 F | HEART RATE: 66 BPM | HEIGHT: 66 IN | OXYGEN SATURATION: 99 % | BODY MASS INDEX: 26.33 KG/M2 | SYSTOLIC BLOOD PRESSURE: 126 MMHG | WEIGHT: 163.8 LBS

## 2023-04-17 DIAGNOSIS — M54.6 CHRONIC LEFT-SIDED THORACIC BACK PAIN: Primary | ICD-10-CM

## 2023-04-17 DIAGNOSIS — G89.29 CHRONIC LEFT-SIDED THORACIC BACK PAIN: Primary | ICD-10-CM

## 2023-04-17 DIAGNOSIS — G89.29 CHRONIC PAIN OF RIGHT WRIST: ICD-10-CM

## 2023-04-17 DIAGNOSIS — M54.6 CHRONIC LEFT-SIDED THORACIC BACK PAIN: ICD-10-CM

## 2023-04-17 DIAGNOSIS — G89.29 CHRONIC LEFT-SIDED THORACIC BACK PAIN: ICD-10-CM

## 2023-04-17 DIAGNOSIS — M25.531 CHRONIC PAIN OF RIGHT WRIST: ICD-10-CM

## 2023-04-17 PROCEDURE — 73110 X-RAY EXAM OF WRIST: CPT | Mod: RT

## 2023-04-17 PROCEDURE — 99214 OFFICE O/P EST MOD 30 MIN: CPT | Performed by: PHYSICIAN ASSISTANT

## 2023-04-17 PROCEDURE — 72070 X-RAY EXAM THORAC SPINE 2VWS: CPT

## 2023-04-17 ASSESSMENT — PAIN SCALES - GENERAL: PAINLEVEL: MODERATE PAIN (5)

## 2023-04-17 NOTE — NURSING NOTE
Patient presents to clinic with muscle pain in back after slipping on ice in November and right wrist decreased ROM since June.  Lesli Carlos LPN ....................  4/17/2023   9:49 AM

## 2023-04-26 ENCOUNTER — HOSPITAL ENCOUNTER (OUTPATIENT)
Dept: PHYSICAL THERAPY | Facility: OTHER | Age: 63
Setting detail: THERAPIES SERIES
Discharge: HOME | End: 2023-04-26
Attending: PHYSICIAN ASSISTANT
Payer: OTHER GOVERNMENT

## 2023-04-26 ENCOUNTER — OFFICE VISIT (OUTPATIENT)
Dept: FAMILY MEDICINE | Facility: OTHER | Age: 63
End: 2023-04-26
Attending: FAMILY MEDICINE
Payer: OTHER GOVERNMENT

## 2023-04-26 VITALS
HEART RATE: 67 BPM | DIASTOLIC BLOOD PRESSURE: 82 MMHG | SYSTOLIC BLOOD PRESSURE: 126 MMHG | BODY MASS INDEX: 26.37 KG/M2 | OXYGEN SATURATION: 97 % | WEIGHT: 164.6 LBS | RESPIRATION RATE: 16 BRPM | TEMPERATURE: 97.1 F

## 2023-04-26 DIAGNOSIS — M19.031 ARTHRITIS OF SCAPHOID-TRAPEZIUM-TRAPEZOID JOINT OF RIGHT HAND: Primary | ICD-10-CM

## 2023-04-26 DIAGNOSIS — G89.29 CHRONIC LEFT-SIDED THORACIC BACK PAIN: ICD-10-CM

## 2023-04-26 DIAGNOSIS — G56.01 CARPAL TUNNEL SYNDROME OF RIGHT WRIST: ICD-10-CM

## 2023-04-26 DIAGNOSIS — M54.6 CHRONIC LEFT-SIDED THORACIC BACK PAIN: ICD-10-CM

## 2023-04-26 PROCEDURE — 97161 PT EVAL LOW COMPLEX 20 MIN: CPT | Mod: GP

## 2023-04-26 PROCEDURE — 97140 MANUAL THERAPY 1/> REGIONS: CPT | Mod: GP

## 2023-04-26 PROCEDURE — 97112 NEUROMUSCULAR REEDUCATION: CPT | Mod: GP

## 2023-04-26 PROCEDURE — 99215 OFFICE O/P EST HI 40 MIN: CPT | Performed by: FAMILY MEDICINE

## 2023-04-26 ASSESSMENT — PAIN SCALES - GENERAL: PAINLEVEL: EXTREME PAIN (9)

## 2023-04-26 NOTE — PROGRESS NOTES
04/26/23 0900   General Information   Type of Visit Initial OP Ortho PT Evaluation   Start of Care Date 04/26/23   Referring Physician Kathi Morse PA-C   Patient/Family Goals Statement reduce pain, return to normal movement   Orders Evaluate and Treat   Date of Order 04/17/23   Certification Required? No   Medical Diagnosis M54.6, G89.29 (ICD-10-CM) - Chronic left-sided thoracic back pain   Surgical/Medical history reviewed Yes   Body Part(s)   Body Part(s) Lumbar Spine/SI   Presentation and Etiology   Pertinent history of current problem (include personal factors and/or comorbidities that impact the POC) Slipped on ice in November 2022, did not fall, twisted trunk to right and stopped fall with left foot. Horrible spasms left sided, bottom of ribs and up spine. Makes her lose her breath. No arm symptoms. Will lock up at times with bending. 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 Pain;E. Decreased flexibility;D. Decreased ROM;M. Locking or catching   Functional Limitations perform activities of daily living;perform required work activities;perform desired leisure / sports activities   Symptom Location left back, rib cage   Onset date of current episode/exacerbation 04/17/23   Chronicity New   Pain rating (0-10 point scale) Best (/10);Worst (/10)   Best (/10) 0   Worst (/10) 10   Pain quality A. Sharp;H. Other   Pain quality comment spasms   Frequency of pain/symptoms D. Other   Pain frequency comment trying to lie down to sleep, changing postitions   Pain/symptoms exacerbated by E. Rest;G. Certain positions;H. Overhead reach;I. Bending   Pain/symptoms eased by D. Nothing;E. Changing positions   Progression of symptoms since onset: Worsened   Prior Level of Function   Prior Level of Function-Mobility no limitations   Current Level of Function   Patient role/employment history A. Employed   Employment Comments runs [employer]   Fall Risk Screen    Fall screen completed by PT   Have you fallen 2 or more times in the past year? No   Have you fallen and had an injury in the past year? Yes   Is patient a fall risk? No   Abuse Screen (yes response referral indicated)   Feels Unsafe at Home or Work/School no   Feels Threatened by Someone no   Does Anyone Try to Keep You From Having Contact with Others or Doing Things Outside Your Home? no   Physical Signs of Abuse Present no   Lumbar Spine/SI Objective Findings   Flexion ROM limited due to pain   Extension ROM limited due to pain   Lumbar ROM Comment limited trunk rotaiton by 50% left and right   Pelvic Screen will continue to assess, - FFT   Lumbar/Hip/Knee/Foot Strength Comments will continue to assess   Segmental Mobility FRS left T11, FRS left T12   Palpation Postural loading limited head R/L shoulder L/L L/R R/L pelvic R/R; general listening to anterior chest wall then lateral left ribcage; local listening to sternal body, left lung. + sternal compression and decompression test. Myofascial restrictions with left horizontal fissure, left lung pleura, left pulmonary hilum. Noted limitation with rib elevation and excursion with inhalation and exhalation.   Posture left shoulder superior to right, right iliac crest superior to left, hyperactivity of left upper trap, guarded motion   Planned Therapy Interventions   Planned Therapy Interventions manual therapy;neuromuscular re-education;strengthening;stretching   Planned Modality Interventions   Planned Modality Interventions Cryotherapy;Electrical stimulation;Hot packs;Ultrasound   Clinical Impression   Criteria for Skilled Therapeutic Interventions Met yes, treatment indicated   PT Diagnosis back pain, myofascial tightness and pain   Influenced by the following impairments Pain, loss of motion, locking/catching joints   Functional limitations due to impairments poor sleep due to pain, spasms that limit spine movements, limited bending, limited trunk rotation,  limited reaching overhead   Clinical Presentation Evolving/Changing   Clinical Decision Making (Complexity) Low complexity   Therapy Frequency 2 times/Week   Predicted Duration of Therapy Intervention (days/wks) 3-6 months   Risk & Benefits of therapy have been explained Yes   Patient, Family & other staff in agreement with plan of care Yes   Clinical Impression Comments Patient with onset of left sided back and chest wall pain following a slip on ice; noted myofascial restrictions with left lung fissure and pleura, limited rib cage mobility, segmental dysfunctions of lower thoracic spine.   ORTHO GOALS   PT Ortho Eval Goals 1;2;3;4   Ortho Goal 1   Goal Identifier sleep   Goal Description Patient to return to sleep that is not interrupted by pain.   Target Date 10/26/23   Ortho Goal 2   Goal Identifier bending   Goal Description Patient to return to bending of spine to reach to ground for daily tasks and self cares without limitation from pain.   Target Date 10/26/23   Ortho Goal 3   Goal Identifier overhead reach   Goal Description Patient to reach overhead to retrieve items from cupboard without limitations from pain.   Target Date 10/26/23   Ortho Goal 4   Goal Identifier position changes   Goal Description Patient to complete position changes without onset of muscle spams.   Target Date 10/26/23   Total Evaluation Time   PT Eval, Low Complexity Minutes (62052) 20

## 2023-04-26 NOTE — NURSING NOTE
Pt presents today for right wrist pain.  States that she does not recall injury.  Has been going on since last June

## 2023-04-26 NOTE — PROGRESS NOTES
Sports Medicine Office Note    HPI:  62-year-old right-handed female coming in for evaluation of right wrist pain that began summer 2022 without inciting event or injury.  She localizes pain to the radial aspect of the wrist.  She has pain with gripping/grasping and putting weight through her arm with her wrist in an extended position.  She rates her pain at 9/10.  She characterizes the pain as aching, throbbing, and burning.  She will occasionally have some burning pain that extends up the radial aspect of the forearm.  At night she does notice some numbness and tingling that extends into all fingers of her hand.  She has tried heat, ice, and OTC medications to help with her symptoms.  She was seen in primary care clinic on 4/17 and x-rays were ordered demonstrating severe arthritis of the STT joint.  No symptoms on the left side.      EXAM:  /82 (BP Location: Right arm, Patient Position: Sitting, Cuff Size: Adult Regular)   Pulse 67   Temp 97.1  F (36.2  C) (Temporal)   Resp 16   Wt 74.7 kg (164 lb 9.6 oz)   SpO2 97%   BMI 26.37 kg/m    MUSCULOSKELETAL EXAM:  RIGHT WRIST  Inspection:  -No gross deformity  -No bruising or swelling  -Scars:  None    Tenderness to palpation of the:  -Medial epicondyle:  Negative  -Lateral epicondyle:  Negative  -Radial head:  Negative  -Radial shaft:  Negative  -Ulnar shaft:  Negative  -Forearm flexors and extensors:  Negative  -Ulnar styloid:  Negative  -Distal radius:  Negative  -Germaine's tubercle: Mild pain  -Scapholunate ligament: Positive  -Scaphoid in anatomical snuffbox: Positive  -1st CMC joint: Positive  -1st MCP joint:  Negative  -Metacarpals:  Negative    Range of Motion:  -Wrist flexion:  80  -Wrist extension:  80  -Radiolunar diviation arc:  50    Sensation:  -Intact to light touch in the radial, median, and ulnar nerve distributions    Motor:  -Intact AIN, PIN, and IO    Special Tests:  -Phalen test: Weakly positive  -Tinel test:  Negative  -Durkan test:  Weakly positive  -Finkelstein test:  Negative  -1st CMC grind test: Positive  -Valgus stress at 1st MCP:  Negative  -TFCC grind test:  Negative    Other:  -No signs of cyanosis. Normal skin temperature of the upper extremity.  -Elbow:  No gross deformity. Full range of motion.  -Left wrist:  No gross deformity. No palpable tenderness. Normal strength and ROM.      IMAGIN2023: 3 view right wrist x-ray  - Severe degenerative changes within the STT joint.  No acute bony abnormalities.      ASSESSMENT/PLAN:  Diagnoses and all orders for this visit:  Arthritis of cyjomfyp-peirxqxpk-jvemoqjsd joint of right hand  -     Orthopedic  Referral  -     Wrist/Arm Supplies Order Wrist Brace; Right; with thumb spica  Carpal tunnel syndrome of right wrist  -     Wrist/Arm Supplies Order Wrist Brace; Right; with thumb spica    62-year-old female with severe STT joint arthritis of the right hand.  She likely also has some underlying carpal tunnel syndrome.  Her carpal tunnel symptoms are difficult to reproduce on exam.  X-rays from  were personally reviewed in the office with findings as demonstrated above by my interpretation.  We discussed treatment options to include rest, ice, bracing, topical medications, oral medications, hand therapy, injections, and surgical referral.  The symptoms extending up her forearm could be due to a more mild de Quervain's tenosynovitis or referred pain from the arthritis.  - Patient fit for a wrist brace to be worn at night for carpal tunnel symptoms, included thumb spica for any potential involvement of the first dorsal compartment  - Discussed STT joint injection, patient will return for an ultrasound-guided injection into this joint  - Offered referral for hand therapy, we will hold off at this time  - OTC analgesics as needed      Josef Morales MD  2023  2:47 PM    Total time spent with this patient was 47 minutes which included chart review, visualization and  independent interpretation of images, time spent with the patient, and documentation.    Procedure time:  0 minute(s)

## 2023-05-01 ENCOUNTER — HOSPITAL ENCOUNTER (OUTPATIENT)
Dept: PHYSICAL THERAPY | Facility: OTHER | Age: 63
Setting detail: THERAPIES SERIES
Discharge: HOME | End: 2023-05-01
Attending: PHYSICIAN ASSISTANT
Payer: OTHER GOVERNMENT

## 2023-05-01 PROCEDURE — 97112 NEUROMUSCULAR REEDUCATION: CPT | Mod: GP

## 2023-05-01 PROCEDURE — 97140 MANUAL THERAPY 1/> REGIONS: CPT | Mod: GP

## 2023-05-09 ENCOUNTER — OFFICE VISIT (OUTPATIENT)
Dept: FAMILY MEDICINE | Facility: OTHER | Age: 63
End: 2023-05-09
Attending: FAMILY MEDICINE
Payer: OTHER GOVERNMENT

## 2023-05-09 VITALS
RESPIRATION RATE: 16 BRPM | BODY MASS INDEX: 25.85 KG/M2 | DIASTOLIC BLOOD PRESSURE: 80 MMHG | OXYGEN SATURATION: 95 % | SYSTOLIC BLOOD PRESSURE: 130 MMHG | WEIGHT: 161.4 LBS | TEMPERATURE: 97.4 F | HEART RATE: 65 BPM

## 2023-05-09 DIAGNOSIS — G56.01 CARPAL TUNNEL SYNDROME OF RIGHT WRIST: ICD-10-CM

## 2023-05-09 DIAGNOSIS — M19.031 ARTHRITIS OF SCAPHOID-TRAPEZIUM-TRAPEZOID JOINT OF RIGHT HAND: Primary | ICD-10-CM

## 2023-05-09 PROCEDURE — 250N000009 HC RX 250: Performed by: FAMILY MEDICINE

## 2023-05-09 PROCEDURE — 250N000011 HC RX IP 250 OP 636: Mod: JW | Performed by: FAMILY MEDICINE

## 2023-05-09 PROCEDURE — 20606 DRAIN/INJ JOINT/BURSA W/US: CPT | Mod: RT | Performed by: FAMILY MEDICINE

## 2023-05-09 RX ORDER — LIDOCAINE HYDROCHLORIDE 10 MG/ML
0.5 INJECTION, SOLUTION EPIDURAL; INFILTRATION; INTRACAUDAL; PERINEURAL ONCE
Status: COMPLETED | OUTPATIENT
Start: 2023-05-09 | End: 2023-05-09

## 2023-05-09 RX ORDER — TRIAMCINOLONE ACETONIDE 40 MG/ML
20 INJECTION, SUSPENSION INTRA-ARTICULAR; INTRAMUSCULAR ONCE
Status: COMPLETED | OUTPATIENT
Start: 2023-05-09 | End: 2023-05-09

## 2023-05-09 RX ADMIN — LIDOCAINE HYDROCHLORIDE 0.5 ML: 10 INJECTION, SOLUTION INFILTRATION; PERINEURAL at 14:05

## 2023-05-09 RX ADMIN — TRIAMCINOLONE ACETONIDE 20 MG: 40 INJECTION, SUSPENSION INTRA-ARTICULAR; INTRAMUSCULAR at 14:06

## 2023-05-09 ASSESSMENT — PAIN SCALES - GENERAL: PAINLEVEL: SEVERE PAIN (7)

## 2023-05-09 NOTE — PROGRESS NOTES
Sports Medicine Office Note    HPI:  62-year-old female coming in for follow-up evaluation of right wrist pain that began summer 2022 without inciting event or injury.  She has pain about the radial aspect of the wrist.  She was last seen in this office on .  At that time she was offered a wrist brace which she has been wearing, providing mild relief of symptoms.  She also comes in for an ultrasound-guided injection into the STT joint of the right wrist.  She rates her pain at 7/10.  She characterizes the pain as aching, throbbing, and burning.      EXAM:  /80 (BP Location: Right arm, Patient Position: Sitting, Cuff Size: Adult Regular)   Pulse 65   Temp 97.4  F (36.3  C) (Temporal)   Resp 16   Wt 73.2 kg (161 lb 6.4 oz)   SpO2 95%   BMI 25.85 kg/m    Musculoskeletal exam: Right wrist  - No gross deformity  - No bruising or swelling  - Normal skin temperature without cyanosis  - Pain to palpation at the radial carpus      IMAGIN2023: 3 view right wrist x-ray  - Severe degenerative changes within the STT joint.  No acute bony abnormalities.      ASSESSMENT/PLAN:  Diagnoses and all orders for this visit:  Arthritis of xdeuocwh-fblcrncor-jotrkulvc joint of right hand  -     CT ARTHROCENTESIS ASPIR&/INJ INTERM JT/BURS W/US  -     POC US SOFT TISSUE  -     triamcinolone (KENALOG-40) injection 20 mg  -     lidocaine (PF) (XYLOCAINE) 1 % injection 0.5 mL  -     lidocaine (PF) (XYLOCAINE) 1 % injection 0.5 mL  Carpal tunnel syndrome of right wrist    62-year-old female with severe STT joint arthritis of the right hand.  X-rays from  were again personally reviewed in the office with the findings as demonstrated above by my interpretation.  Treatment options include rest, ice, bracing, topical medications, oral medications, hand therapy, injections, and surgical referral.  After reviewing the risks/benefits, the patient elects to proceed with an ultrasound-guided injection into the right STT  joint.  See procedure note below:    PROCEDURE:  Ultrasound Guided Injection of the Right Txibokmd-Dbyklhkwn-Gretdzycf joint      EQUIPMENT:  Ganesh Affiniti 70 with Linear [id number] (7-15MHz) Transducer (Hockey Stick).      PROCEDURAL PAUSE:    A procedural pause was conducted to verify:  correct patient identity, procedure to be performed, and as applicable, correct side, site, and correct patient position.      INFORMED CONSENT:   I discussed the risks, possible benefits, and alternatives to injection.  Following denial of allergy and review of potential side effects and complications (including but not necessarily limited to infection, allergic reaction, fat necrosis, skin depigmentation, local tissue breakdown, systemic effects of corticosteroids, elevation of blood glucose, injury to soft tissue and/or nerves, and seizure), all questions were answered, and consent was given to proceed.  Patient verbalized understanding.      PROCEDURE DETAILS:    The use of direct ultrasound visualization of the needle (rather than a non-guided ultrasound procedure) was required to increase patient safety by excluding inadvertent intramuscular or intratendinous placement and minimizing bleeding and injury by avoiding osteochondral and nearby neurovascular structures.  Guidance also maximizes accurate injection placement and likely clinical benefit beyond that obtained with a non-guided injection.  This allows increased diagnostic specificity when evaluating effectiveness of the injection.      Prior to the procedure, the right base of the thumb was examined with a 15MHz linear hockey stick transducer to determine the optimal needle path and visualize the radial aspect of the STT joint in a longitudinal plane relative to the metacarpals.  Following this, the skin was marked, and the base of the thumb was prepared with chlorhexidine.  Local anesthesia was obtained with 0.5mL of 1% lidocaine.  Then this area was re-examined using  the same transducer, a sterile ultrasound transducer cover, sterile gloves, and sterile gel.  Under ultrasound guidance, a 1.5-inch 25-gauge needle was advanced from proximal to distal taking an in-plane approach into the STT joint.  One needle pass was performed.  After ultrasound visualization of the needle tip in the target area and negative aspiration for blood, a mixture of 0.5mL of 1% lidocaine and 0.5mL of triamcinolone (40mg/mL) was injected into the right STT joint.  0mL was wasted.  Images have been saved on the musculoskeletal ultrasound in clinic.      The patient tolerated the procedure without complication and was discharged in good condition after a short observation period.  The patient was instructed to contact me regarding any questions pertaining to the procedure.      DIAGNOSIS:    -Successful ultrasound guided injection of the right kkomwcos-phbhgntlw-zgrnohnkg joint without immediate complication      PLAN:   -Post-procedure care reviewed, including avoiding submersion of the injection site for 48 hours   -Return precautions reviewed for signs/symptoms that would be concerning for infection   -The patient was instructed to ice and take APAP this evening if needed  -Continue to wear the wrist brace as needed  -Return to clinic as needed  -Discussed the possibility of hand therapy, will hold off at this time      Josef Morales MD  5/9/2023  1:03 PM    Total time spent with this patient was 47 minutes which included chart review, visualization and independent interpretation of images, time spent with the patient, and documentation.    Procedure time:  24 minute(s)

## 2023-05-16 ENCOUNTER — HOSPITAL ENCOUNTER (OUTPATIENT)
Dept: PHYSICAL THERAPY | Facility: OTHER | Age: 63
Setting detail: THERAPIES SERIES
Discharge: HOME | End: 2023-05-16
Attending: PHYSICIAN ASSISTANT
Payer: OTHER GOVERNMENT

## 2023-05-16 PROCEDURE — 97112 NEUROMUSCULAR REEDUCATION: CPT | Mod: GP

## 2023-05-16 PROCEDURE — 97140 MANUAL THERAPY 1/> REGIONS: CPT | Mod: GP

## 2023-05-18 ENCOUNTER — THERAPY VISIT (OUTPATIENT)
Dept: PHYSICAL THERAPY | Facility: OTHER | Age: 63
End: 2023-05-18
Attending: PHYSICIAN ASSISTANT
Payer: OTHER GOVERNMENT

## 2023-05-18 DIAGNOSIS — G89.29 CHRONIC LEFT-SIDED THORACIC BACK PAIN: Primary | ICD-10-CM

## 2023-05-18 DIAGNOSIS — M54.6 CHRONIC LEFT-SIDED THORACIC BACK PAIN: Primary | ICD-10-CM

## 2023-05-18 PROCEDURE — 97140 MANUAL THERAPY 1/> REGIONS: CPT | Mod: GP

## 2023-05-18 PROCEDURE — 97112 NEUROMUSCULAR REEDUCATION: CPT | Mod: GP

## 2023-05-26 ENCOUNTER — THERAPY VISIT (OUTPATIENT)
Dept: PHYSICAL THERAPY | Facility: OTHER | Age: 63
End: 2023-05-26
Attending: PHYSICIAN ASSISTANT
Payer: OTHER GOVERNMENT

## 2023-05-26 DIAGNOSIS — G89.29 CHRONIC LEFT-SIDED THORACIC BACK PAIN: Primary | ICD-10-CM

## 2023-05-26 DIAGNOSIS — M54.6 CHRONIC LEFT-SIDED THORACIC BACK PAIN: Primary | ICD-10-CM

## 2023-05-26 PROCEDURE — 97112 NEUROMUSCULAR REEDUCATION: CPT | Mod: GP

## 2023-06-06 ENCOUNTER — THERAPY VISIT (OUTPATIENT)
Dept: PHYSICAL THERAPY | Facility: OTHER | Age: 63
End: 2023-06-06
Attending: PHYSICIAN ASSISTANT
Payer: OTHER GOVERNMENT

## 2023-06-06 DIAGNOSIS — M54.6 CHRONIC LEFT-SIDED THORACIC BACK PAIN: Primary | ICD-10-CM

## 2023-06-06 DIAGNOSIS — G89.29 CHRONIC LEFT-SIDED THORACIC BACK PAIN: Primary | ICD-10-CM

## 2023-06-06 PROCEDURE — 97112 NEUROMUSCULAR REEDUCATION: CPT | Mod: GP

## 2023-06-06 PROCEDURE — 97140 MANUAL THERAPY 1/> REGIONS: CPT | Mod: GP

## 2023-06-08 ENCOUNTER — THERAPY VISIT (OUTPATIENT)
Dept: PHYSICAL THERAPY | Facility: OTHER | Age: 63
End: 2023-06-08
Attending: PHYSICIAN ASSISTANT
Payer: OTHER GOVERNMENT

## 2023-06-08 DIAGNOSIS — G89.29 CHRONIC LEFT-SIDED THORACIC BACK PAIN: Primary | ICD-10-CM

## 2023-06-08 DIAGNOSIS — M54.6 CHRONIC LEFT-SIDED THORACIC BACK PAIN: Primary | ICD-10-CM

## 2023-06-08 PROCEDURE — 97110 THERAPEUTIC EXERCISES: CPT | Mod: GP

## 2023-06-08 PROCEDURE — 97112 NEUROMUSCULAR REEDUCATION: CPT | Mod: GP

## 2023-06-13 ENCOUNTER — THERAPY VISIT (OUTPATIENT)
Dept: PHYSICAL THERAPY | Facility: OTHER | Age: 63
End: 2023-06-13
Attending: PHYSICIAN ASSISTANT
Payer: OTHER GOVERNMENT

## 2023-06-13 DIAGNOSIS — G89.29 CHRONIC LEFT-SIDED THORACIC BACK PAIN: Primary | ICD-10-CM

## 2023-06-13 DIAGNOSIS — M54.6 CHRONIC LEFT-SIDED THORACIC BACK PAIN: Primary | ICD-10-CM

## 2023-06-13 PROCEDURE — 97112 NEUROMUSCULAR REEDUCATION: CPT | Mod: GP

## 2023-06-13 PROCEDURE — 97140 MANUAL THERAPY 1/> REGIONS: CPT | Mod: GP

## 2023-06-20 ENCOUNTER — THERAPY VISIT (OUTPATIENT)
Dept: PHYSICAL THERAPY | Facility: OTHER | Age: 63
End: 2023-06-20
Attending: PHYSICIAN ASSISTANT
Payer: OTHER GOVERNMENT

## 2023-06-20 DIAGNOSIS — G89.29 CHRONIC LEFT-SIDED THORACIC BACK PAIN: Primary | ICD-10-CM

## 2023-06-20 DIAGNOSIS — M54.6 CHRONIC LEFT-SIDED THORACIC BACK PAIN: Primary | ICD-10-CM

## 2023-06-20 PROCEDURE — 97112 NEUROMUSCULAR REEDUCATION: CPT | Mod: GP

## 2023-06-20 PROCEDURE — 97140 MANUAL THERAPY 1/> REGIONS: CPT | Mod: GP

## 2023-06-22 ENCOUNTER — THERAPY VISIT (OUTPATIENT)
Dept: PHYSICAL THERAPY | Facility: OTHER | Age: 63
End: 2023-06-22
Attending: PHYSICIAN ASSISTANT
Payer: OTHER GOVERNMENT

## 2023-06-22 DIAGNOSIS — G89.29 CHRONIC LEFT-SIDED THORACIC BACK PAIN: Primary | ICD-10-CM

## 2023-06-22 DIAGNOSIS — M54.6 CHRONIC LEFT-SIDED THORACIC BACK PAIN: Primary | ICD-10-CM

## 2023-06-22 PROCEDURE — 97112 NEUROMUSCULAR REEDUCATION: CPT | Mod: GP

## 2023-06-22 NOTE — PROGRESS NOTES
PLAN  Continue therapy per current plan of care.    Beginning/End Dates of Progress Note Reporting Period:   4/26/23 to 06/22/2023    Referring Provider:  Kathi Morse             06/22/23 0500   Appointment Info   Signing clinician's name / credentials Bárbara Estrada, PT, CLT, CAPP   Visits Used 10   GOALS   PT Goals 2;3;4   PT Goal 1   Goal Identifier pain   Goal Description Patient to return to sleep that is not interrupted by pain.   Goal Progress rolling continues to get easier, able to sleep uninterupted by pain   Target Date 10/26/23   PT Goal 2   Goal Identifier bending   Goal Description Patient to return to bending of spine to reach to ground for daily tasks and self cares without limitation from pain.   Goal Progress bending is improving, still leary of it.   Target Date 10/26/23   PT Goal 3   Goal Identifier overhead reach   Goal Description Patient to reach overhead to retrieve items from cupboard without limitations from pain.   Goal Progress able to reach over head   Target Date 10/26/23   Date Met 06/22/23   PT Goal 4   Goal Identifier position changes   Goal Description Patient to complete position changes without onset of muscle spams.   Goal Progress able to complete but leary of it   Target Date 10/26/23   Subjective Report   Subjective Report Only a couple of little spasms have occurred. Lying is much easier on side. Lying on back is hard, feels like there is a big know in left mid back. Feels like she must keep her back arched a littel or spasms will start.   Objective Measure 1   Objective Measure postural loading   Details limited loading left shoulder; general listening to left TL junction area; local listening to T10   Objective Measure 2   Objective Measure joint   Details ERS left T10, FRS left T6, ERS left T3, FRS left C7   Objective Measure 3   Objective Measure myofascial   Details dorsal rami left side   Treatment Interventions (PT)   Interventions Neuromuscular  Re-education;Manual Therapy;Therapeutic Procedure/Exercise   Therapeutic Procedure/Exercise   Ther Proc 1 strengthening for spine stabilization   Neuromuscular Re-education   Neuromuscular re-ed of mvmt, balance, coord, kinesthetic sense, posture, proprioception minutes (74381) 25   Neuro Re-ed 1 muscle energy technique (MET) to restore function and reduce pain   Neuro Re-ed 1 - Details MET- ERS left T10, FRS left T6, ERS left T3, FRS left T1   Patient Response/Progress reduced hyperactivity of left paraspinals   Manual Therapy   Manual Therapy: Mobilization, MFR, MLD, friction massage minutes (38540) 10   Manual Therapy 1 MFr, organ specific fascial mobilzation (OSFM) to restore function and reduce pain   Manual Therapy 1 - Details MFR/OSFM- dorsal rami left, left paraspinals in t and l spine   Plan   Home program box breathing, ice/heat, seated TL junction mobs, seated pelvis tilts, hip flexor stretch   Total Session Time   Timed Code Treatment Minutes 35   Total Treatment Time (sum of timed and untimed services) 35

## 2023-06-28 ENCOUNTER — THERAPY VISIT (OUTPATIENT)
Dept: PHYSICAL THERAPY | Facility: OTHER | Age: 63
End: 2023-06-28
Attending: PHYSICIAN ASSISTANT
Payer: OTHER GOVERNMENT

## 2023-06-28 DIAGNOSIS — M54.6 CHRONIC LEFT-SIDED THORACIC BACK PAIN: Primary | ICD-10-CM

## 2023-06-28 DIAGNOSIS — G89.29 CHRONIC LEFT-SIDED THORACIC BACK PAIN: Primary | ICD-10-CM

## 2023-06-28 PROCEDURE — 97140 MANUAL THERAPY 1/> REGIONS: CPT | Mod: GP

## 2023-06-28 PROCEDURE — 97112 NEUROMUSCULAR REEDUCATION: CPT | Mod: GP

## 2023-06-29 ENCOUNTER — THERAPY VISIT (OUTPATIENT)
Dept: PHYSICAL THERAPY | Facility: OTHER | Age: 63
End: 2023-06-29
Attending: PHYSICIAN ASSISTANT
Payer: OTHER GOVERNMENT

## 2023-06-29 DIAGNOSIS — G89.29 CHRONIC LEFT-SIDED THORACIC BACK PAIN: Primary | ICD-10-CM

## 2023-06-29 DIAGNOSIS — M54.6 CHRONIC LEFT-SIDED THORACIC BACK PAIN: Primary | ICD-10-CM

## 2023-06-29 PROCEDURE — 97112 NEUROMUSCULAR REEDUCATION: CPT | Mod: GP

## 2023-06-29 PROCEDURE — 97140 MANUAL THERAPY 1/> REGIONS: CPT | Mod: GP

## 2023-07-03 ENCOUNTER — THERAPY VISIT (OUTPATIENT)
Dept: PHYSICAL THERAPY | Facility: OTHER | Age: 63
End: 2023-07-03
Attending: PHYSICIAN ASSISTANT
Payer: OTHER GOVERNMENT

## 2023-07-03 DIAGNOSIS — M54.6 CHRONIC LEFT-SIDED THORACIC BACK PAIN: Primary | ICD-10-CM

## 2023-07-03 DIAGNOSIS — G89.29 CHRONIC LEFT-SIDED THORACIC BACK PAIN: Primary | ICD-10-CM

## 2023-07-03 PROCEDURE — 97140 MANUAL THERAPY 1/> REGIONS: CPT | Mod: GP

## 2023-07-03 PROCEDURE — 97112 NEUROMUSCULAR REEDUCATION: CPT | Mod: GP

## 2023-07-11 ENCOUNTER — THERAPY VISIT (OUTPATIENT)
Dept: PHYSICAL THERAPY | Facility: OTHER | Age: 63
End: 2023-07-11
Attending: PHYSICIAN ASSISTANT
Payer: OTHER GOVERNMENT

## 2023-07-11 DIAGNOSIS — M54.6 CHRONIC LEFT-SIDED THORACIC BACK PAIN: Primary | ICD-10-CM

## 2023-07-11 DIAGNOSIS — G89.29 CHRONIC LEFT-SIDED THORACIC BACK PAIN: Primary | ICD-10-CM

## 2023-07-11 PROCEDURE — 97140 MANUAL THERAPY 1/> REGIONS: CPT | Mod: GP

## 2023-07-11 PROCEDURE — 97112 NEUROMUSCULAR REEDUCATION: CPT | Mod: GP

## 2023-07-17 ENCOUNTER — THERAPY VISIT (OUTPATIENT)
Dept: PHYSICAL THERAPY | Facility: OTHER | Age: 63
End: 2023-07-17
Attending: PHYSICIAN ASSISTANT
Payer: OTHER GOVERNMENT

## 2023-07-17 DIAGNOSIS — G89.29 CHRONIC LEFT-SIDED THORACIC BACK PAIN: Primary | ICD-10-CM

## 2023-07-17 DIAGNOSIS — M54.6 CHRONIC LEFT-SIDED THORACIC BACK PAIN: Primary | ICD-10-CM

## 2023-07-17 PROCEDURE — 97112 NEUROMUSCULAR REEDUCATION: CPT | Mod: GP

## 2023-07-17 PROCEDURE — 97140 MANUAL THERAPY 1/> REGIONS: CPT | Mod: GP

## 2023-08-01 ENCOUNTER — THERAPY VISIT (OUTPATIENT)
Dept: PHYSICAL THERAPY | Facility: OTHER | Age: 63
End: 2023-08-01
Attending: PHYSICIAN ASSISTANT
Payer: OTHER GOVERNMENT

## 2023-08-01 DIAGNOSIS — M54.6 CHRONIC LEFT-SIDED THORACIC BACK PAIN: Primary | ICD-10-CM

## 2023-08-01 DIAGNOSIS — G89.29 CHRONIC LEFT-SIDED THORACIC BACK PAIN: Primary | ICD-10-CM

## 2023-08-01 PROCEDURE — 97112 NEUROMUSCULAR REEDUCATION: CPT | Mod: GP

## 2023-08-01 PROCEDURE — 97140 MANUAL THERAPY 1/> REGIONS: CPT | Mod: GP

## 2023-08-08 ENCOUNTER — THERAPY VISIT (OUTPATIENT)
Dept: PHYSICAL THERAPY | Facility: OTHER | Age: 63
End: 2023-08-08
Attending: PHYSICIAN ASSISTANT
Payer: OTHER GOVERNMENT

## 2023-08-08 DIAGNOSIS — M54.6 CHRONIC LEFT-SIDED THORACIC BACK PAIN: Primary | ICD-10-CM

## 2023-08-08 DIAGNOSIS — G89.29 CHRONIC LEFT-SIDED THORACIC BACK PAIN: Primary | ICD-10-CM

## 2023-08-08 PROCEDURE — 97112 NEUROMUSCULAR REEDUCATION: CPT | Mod: GP

## 2023-08-24 ENCOUNTER — THERAPY VISIT (OUTPATIENT)
Dept: PHYSICAL THERAPY | Facility: OTHER | Age: 63
End: 2023-08-24
Attending: PHYSICIAN ASSISTANT
Payer: OTHER GOVERNMENT

## 2023-08-24 DIAGNOSIS — G89.29 CHRONIC LEFT-SIDED THORACIC BACK PAIN: Primary | ICD-10-CM

## 2023-08-24 DIAGNOSIS — M54.6 CHRONIC LEFT-SIDED THORACIC BACK PAIN: Primary | ICD-10-CM

## 2023-08-24 PROCEDURE — 97112 NEUROMUSCULAR REEDUCATION: CPT | Mod: GP

## 2023-08-24 PROCEDURE — 97140 MANUAL THERAPY 1/> REGIONS: CPT | Mod: GP

## 2023-10-07 ENCOUNTER — HEALTH MAINTENANCE LETTER (OUTPATIENT)
Age: 63
End: 2023-10-07

## 2023-10-10 ENCOUNTER — TELEPHONE (OUTPATIENT)
Dept: FAMILY MEDICINE | Facility: OTHER | Age: 63
End: 2023-10-10
Payer: OTHER GOVERNMENT

## 2023-10-10 ENCOUNTER — OFFICE VISIT (OUTPATIENT)
Dept: FAMILY MEDICINE | Facility: OTHER | Age: 63
End: 2023-10-10
Attending: NURSE PRACTITIONER
Payer: OTHER GOVERNMENT

## 2023-10-10 VITALS
HEIGHT: 66 IN | DIASTOLIC BLOOD PRESSURE: 88 MMHG | SYSTOLIC BLOOD PRESSURE: 136 MMHG | WEIGHT: 156.2 LBS | BODY MASS INDEX: 25.1 KG/M2 | RESPIRATION RATE: 16 BRPM | HEART RATE: 57 BPM | OXYGEN SATURATION: 98 % | TEMPERATURE: 98.4 F
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
## 2023-10-10 DIAGNOSIS — S80.861A TICK BITE OF RIGHT LOWER LEG, INITIAL ENCOUNTER: Primary | ICD-10-CM

## 2023-10-10 PROCEDURE — 99213 OFFICE O/P EST LOW 20 MIN: CPT | Performed by: STUDENT IN AN ORGANIZED HEALTH CARE EDUCATION/TRAINING PROGRAM

## 2023-10-10 RX ORDER — DOXYCYCLINE 100 MG/1
200 CAPSULE ORAL ONCE
Qty: 2 CAPSULE | Refills: 0 | Status: SHIPPED | OUTPATIENT
Start: 2023-10-10 | End: 2023-10-10

## 2023-10-10 ASSESSMENT — PAIN SCALES - GENERAL: PAINLEVEL: NO PAIN (0)

## 2023-10-10 NOTE — PROGRESS NOTES
"  Assessment & Plan     (S80.861A,  W57.XXXA) Tick bite of right lower leg, initial encounter  (primary encounter diagnosis)    Comment: Tick bite, it does appear like an engorged deer tick.  Questionable how long it has been attached to the leg.  No signs of secondary bacterial infection to the skin but rather irritation from the tick bite.    Plan: doxycycline hyclate (VIBRAMYCIN) 100 MG capsule       Plan to treat with a one-time dose of 200 mg of doxycycline as a preventative for Lyme disease.  Discussed that this is mostly effective but there is still a risk of developing Lyme disease.  If she develops symptoms, she should be reevaluated.  Ice and/or hydrocortisone cream as needed for the area.  Follow-up, return to rapid clinic or ER with developing secondary signs of infection.  She is comfortable and agreeable with this plan.        Drea Celis PA-C  [facility]    Subjective   Celio is a 63 year old, presenting for the following health issues:  Insect Bites (tick)      HPI     Patient presents today with concerns of tick bite on her lower right shin.  States she pulled it off this morning.  She did not notice it last night.  However the tick is grayish in color and she does not feel like she could identify it well.  She does note that there is a red irritated area around the site.  She does not take any blood thinners chronically.      Review of Systems   She denies any fevers, chills, sweats, headaches, abnormal rashes.          Objective    /88   Pulse 57   Temp 98.4  F (36.9  C) (Tympanic)   Resp 16   Ht 1.676 m (5' 6\")   Wt 70.9 kg (156 lb 3.2 oz)   LMP  (LMP Unknown)   SpO2 98%   Breastfeeding No   BMI 25.21 kg/m    Body mass index is 25.21 kg/m .    Physical Exam   GENERAL: healthy, alert and no distress  RESP: lungs clear to auscultation - no rales, rhonchi or wheezes  CV: regular rate and rhythm, normal S1 S2  SKIN: Approximately three-quarter centimeter " by three-quarter centimeter area of dark erythema with slight surrounding edema on the right anterior shin without any surrounding scabs, pus, or drainage, no tenderness with palpation

## 2023-10-10 NOTE — NURSING NOTE
"Chief Complaint   Patient presents with    Insect Bites     tick   Patient presents to clinic for a tick bite. She said it wasn't there last night and she found it this morning. The site is located on her right lower leg and appears red.      Damari Baker on 10/10/2023 at 11:42 AM        Initial /88   Pulse 57   Temp 98.4  F (36.9  C) (Tympanic)   Resp 16   Ht 1.676 m (5' 6\")   Wt 70.9 kg (156 lb 3.2 oz)   LMP  (LMP Unknown)   SpO2 98%   Breastfeeding No   BMI 25.21 kg/m   Estimated body mass index is 25.21 kg/m  as calculated from the following:    Height as of this encounter: 1.676 m (5' 6\").    Weight as of this encounter: 70.9 kg (156 lb 3.2 oz).       FOOD SECURITY SCREENING QUESTIONS:    The next two questions are to help us understand your food security.  If you are feeling you need any assistance in this area, we have resources available to support you today.    Hunger Vital Signs:  Within the past 12 months we worried whether our food would run out before we got money to buy more. Never  Within the past 12 months the food we bought just didn't last and we didn't have money to get more. Never      Damari Baker     "

## 2023-10-10 NOTE — TELEPHONE ENCOUNTER
States she has a tick bite that she is reacting to differently than she usually does. Would like to know what she should do. She still has the tick

## 2023-10-18 PROBLEM — M54.6 CHRONIC LEFT-SIDED THORACIC BACK PAIN: Status: RESOLVED | Noted: 2023-05-26 | Resolved: 2023-10-18

## 2023-10-18 PROBLEM — G89.29 CHRONIC LEFT-SIDED THORACIC BACK PAIN: Status: RESOLVED | Noted: 2023-05-26 | Resolved: 2023-10-18

## 2023-10-18 NOTE — PROGRESS NOTES
DISCHARGE  Reason for Discharge: Patient has met all goals.    Equipment Issued: NA    Discharge Plan: Patient to continue home program.    Referring Provider:  Kathi Morse         08/24/23 0500   Appointment Info   Signing clinician's name / credentials Bárbara Estrada, PT, CLT, CAPP   Visits Used 18   GOALS   PT Goals 2;3;4   PT Goal 1   Goal Identifier pain   Goal Description Patient to return to sleep that is not interrupted by pain.   Goal Progress rolling continues to get easier, able to sleep uninterupted by pain   Target Date 10/26/23   PT Goal 2   Goal Identifier bending   Goal Description Patient to return to bending of spine to reach to ground for daily tasks and self cares without limitation from pain.   Goal Progress bending is improving, still leary of it.   Target Date 10/26/23   PT Goal 3   Goal Identifier overhead reach   Goal Description Patient to reach overhead to retrieve items from cupboard without limitations from pain.   Goal Progress able to reach over head   Target Date 10/26/23   Date Met 06/22/23   PT Goal 4   Goal Identifier position changes   Goal Description Patient to complete position changes without onset of muscle spams.   Goal Progress able to complete but leary of it   Target Date 10/26/23   Subjective Report   Subjective Report Feeling well, happy with how well she did with travel on all types of transportation. When prone can get up, can reposition at night.   Objective Measure 1   Objective Measure postural loading   Details limited loading right shoulder; general listening to right anteiror rib cage/abdomen; local listening to   Objective Measure 2   Objective Measure joint   Details FRS left T1   Objective Measure 3   Objective Measure myofascial   Details right triangular ligament   Treatment Interventions (PT)   Interventions Neuromuscular Re-education;Manual Therapy;Therapeutic Procedure/Exercise   Therapeutic Procedure/Exercise   Ther Proc 1 strengthening for  spine stabilization   Neuromuscular Re-education   Neuromuscular re-ed of mvmt, balance, coord, kinesthetic sense, posture, proprioception minutes (25226) 10   Neuro Re-ed 1 muscle energy technique (MET) to restore function and reduce pain   Neuro Re-ed 1 - Details MET- FRS left T1   Manual Therapy   Manual Therapy: Mobilization, MFR, MLD, friction massage minutes (01395) 15   Manual Therapy 1 MFr, organ specific fascial mobilzation (OSFM) to restore function and reduce pain   Manual Therapy 1 - Details MFR- right triangular ligament, diaphragmatic recess right   Patient Response/Progress equal loading   Plan   Home program box breathing, ice/heat, seated TL junction mobs, seated pelvis tilts, hip flexor stretch   Plan for next session will hold chart open two months.   Total Session Time   Timed Code Treatment Minutes 25   Total Treatment Time (sum of timed and untimed services) 25

## 2023-10-24 ENCOUNTER — ALLIED HEALTH/NURSE VISIT (OUTPATIENT)
Dept: FAMILY MEDICINE | Facility: OTHER | Age: 63
End: 2023-10-24
Attending: FAMILY MEDICINE
Payer: OTHER GOVERNMENT

## 2023-10-24 DIAGNOSIS — Z23 HIGH PRIORITY FOR 2019-NCOV VACCINE: ICD-10-CM

## 2023-10-24 DIAGNOSIS — Z23 NEED FOR PROPHYLACTIC VACCINATION AND INOCULATION AGAINST INFLUENZA: Primary | ICD-10-CM

## 2023-10-24 PROCEDURE — 90480 ADMN SARSCOV2 VAC 1/ONLY CMP: CPT

## 2023-10-24 PROCEDURE — 90682 RIV4 VACC RECOMBINANT DNA IM: CPT

## 2023-10-24 PROCEDURE — 90471 IMMUNIZATION ADMIN: CPT

## 2023-10-24 PROCEDURE — 91320 SARSCV2 VAC 30MCG TRS-SUC IM: CPT

## 2023-10-24 NOTE — PROGRESS NOTES
We are scheduling all people aged 6 months and older for updated 1619-9426 COVID-19 vaccines.  [employer] will stock Pfizer COVID-19 vaccines in the clinics.  Patients who are up to date with COVID-19 vaccine will only need a single dose of the updated [id number] vaccine, at least 8 weeks after last dose. Unvaccinated patients or those not up to date may have a different dosing schedule.  To schedule a COVID-19 vaccine appointment, please log in to [employer] using this link to see when and where we have openings (to schedule a child s vaccine, you will need to log into their [employer] account).     [facility] retail pharmacies also administer Pfizer COVID Vaccines to patients 5 years and older. [facility] offer Novavax primary COVID-19 vaccine.  To schedule at a [facility] pharmacy please go to [email or web address].    To learn more about COVID-19 vaccines in general, please visit the CDC website: https://www.cdc.gov/coronavirus/2019-ncov/vaccines/index.html     If you have technical difficulty using [employer], call [phone], option 1 for assistance.    More information about vaccine effectiveness at reducing spread of disease, hospitalizations, and death as well as vaccine safety and answers to other questions can be found on our website: [email or web address]    Mar Brown LPN on 10/24/2023 at 11:42 AM

## 2023-10-30 ENCOUNTER — ALLIED HEALTH/NURSE VISIT (OUTPATIENT)
Dept: FAMILY MEDICINE | Facility: OTHER | Age: 63
End: 2023-10-30
Attending: FAMILY MEDICINE
Payer: OTHER GOVERNMENT

## 2023-10-30 DIAGNOSIS — Z23 ENCOUNTER FOR IMMUNIZATION: Primary | ICD-10-CM

## 2023-10-30 PROCEDURE — 90715 TDAP VACCINE 7 YRS/> IM: CPT

## 2023-10-30 PROCEDURE — 90471 IMMUNIZATION ADMIN: CPT

## 2023-10-30 NOTE — PROGRESS NOTES
Prior to immunization administration, verified patients identity using patient s name and date of birth. Please see Immunization Activity for additional information.     Screening Questionnaire for Adult Immunization    Are you sick today?   No   Do you have allergies to medications, food, a vaccine component or latex?   No   Have you ever had a serious reaction after receiving a vaccination?   No   Do you have a long-term health problem with heart, lung, kidney, or metabolic disease (e.g., diabetes), asthma, a blood disorder, no spleen, complement component deficiency, a cochlear implant, or a spinal fluid leak?  Are you on long-term aspirin therapy?   No   Do you have cancer, leukemia, HIV/AIDS, or any other immune system problem?   No   Do you have a parent, brother, or sister with an immune system problem?   No   In the past 3 months, have you taken medications that affect  your immune system, such as prednisone, other steroids, or anticancer drugs; drugs for the treatment of rheumatoid arthritis, Crohn s disease, or psoriasis; or have you had radiation treatments?   No   Have you had a seizure, or a brain or other nervous system problem?   No   During the past year, have you received a transfusion of blood or blood    products, or been given immune (gamma) globulin or antiviral drug?   No   For women: Are you pregnant or is there a chance you could become       pregnant during the next month?   No   Have you received any vaccinations in the past 4 weeks?   Yes     Immunization questionnaire was positive for at least one answer.  Patient had flu and covid vaccines, however this is not a contraindication for Tdap vaccine today.    I have reviewed the following standing orders:   This patient is due and qualifies for a TDAP vaccine.    Click here for Tdap Standing Order    I have reviewed the vaccines inclusion and exclusion criteria; No concerns regarding eligibility.     Patient instructed to remain in clinic  for 15 minutes afterwards, and to report any adverse reactions.     Screening performed by Natty Matias RN on 10/30/2023 at 10:47 AM.

## 2024-01-02 ENCOUNTER — MYC MEDICAL ADVICE (OUTPATIENT)
Dept: FAMILY MEDICINE | Facility: OTHER | Age: 64
End: 2024-01-02
Payer: OTHER GOVERNMENT

## 2024-01-11 ENCOUNTER — OFFICE VISIT (OUTPATIENT)
Dept: FAMILY MEDICINE | Facility: OTHER | Age: 64
End: 2024-01-11
Attending: FAMILY MEDICINE
Payer: OTHER GOVERNMENT

## 2024-01-11 VITALS
RESPIRATION RATE: 16 BRPM | BODY MASS INDEX: 26.24 KG/M2 | SYSTOLIC BLOOD PRESSURE: 124 MMHG | WEIGHT: 162.6 LBS | DIASTOLIC BLOOD PRESSURE: 84 MMHG | HEART RATE: 77 BPM | TEMPERATURE: 98.1 F | OXYGEN SATURATION: 97 %

## 2024-01-11 DIAGNOSIS — M19.031 ARTHRITIS OF SCAPHOID-TRAPEZIUM-TRAPEZOID JOINT OF RIGHT HAND: Primary | ICD-10-CM

## 2024-01-11 DIAGNOSIS — G56.01 CARPAL TUNNEL SYNDROME OF RIGHT WRIST: ICD-10-CM

## 2024-01-11 PROCEDURE — 20606 DRAIN/INJ JOINT/BURSA W/US: CPT | Mod: RT | Performed by: FAMILY MEDICINE

## 2024-01-11 PROCEDURE — 250N000011 HC RX IP 250 OP 636: Performed by: FAMILY MEDICINE

## 2024-01-11 PROCEDURE — 99213 OFFICE O/P EST LOW 20 MIN: CPT | Mod: 25 | Performed by: FAMILY MEDICINE

## 2024-01-11 PROCEDURE — 250N000009 HC RX 250: Performed by: FAMILY MEDICINE

## 2024-01-11 RX ORDER — TRIAMCINOLONE ACETONIDE 40 MG/ML
20 INJECTION, SUSPENSION INTRA-ARTICULAR; INTRAMUSCULAR ONCE
Status: COMPLETED | OUTPATIENT
Start: 2024-01-11 | End: 2024-01-11

## 2024-01-11 RX ORDER — LIDOCAINE HYDROCHLORIDE 10 MG/ML
0.5 INJECTION, SOLUTION EPIDURAL; INFILTRATION; INTRACAUDAL; PERINEURAL ONCE
Status: COMPLETED | OUTPATIENT
Start: 2024-01-11 | End: 2024-01-11

## 2024-01-11 RX ADMIN — LIDOCAINE HYDROCHLORIDE 0.5 ML: 10 INJECTION, SOLUTION INFILTRATION; PERINEURAL at 09:31

## 2024-01-11 RX ADMIN — LIDOCAINE HYDROCHLORIDE 0.5 ML: 10 INJECTION, SOLUTION INFILTRATION; PERINEURAL at 09:30

## 2024-01-11 RX ADMIN — TRIAMCINOLONE ACETONIDE 20 MG: 40 INJECTION, SUSPENSION INTRA-ARTICULAR; INTRAMUSCULAR at 09:31

## 2024-01-11 ASSESSMENT — PAIN SCALES - GENERAL: PAINLEVEL: EXTREME PAIN (8)

## 2024-01-11 NOTE — PROGRESS NOTES
Sports Medicine Office Note    HPI:  63-year-old female coming in for follow-up evaluation of right wrist pain.  Her pain began in summer 2022 without inciting event or injury.  She was last seen in this office on .  At that visit she received a corticosteroid injection into the STT joint under ultrasound guidance.  Her symptoms have returned in the last 6 weeks.  She currently rates her pain at 8/10.  She characterized the pain as aching and burning.  She is using ibuprofen to help with her symptoms.  She also notices some pain extending into the thumb, wrist, and radial forearm.      EXAM:  /84 (BP Location: Right arm, Patient Position: Sitting, Cuff Size: Adult Regular)   Pulse 77   Temp 98.1  F (36.7  C) (Temporal)   Resp 16   Wt 73.8 kg (162 lb 9.6 oz)   SpO2 97%   BMI 26.24 kg/m    Musculoskeletal exam: Right wrist  - No gross deformity  - No bruising or soft tissue swelling  - Normal skin temperature without cyanosis  - Pain to palpation of the radial carpus      IMAGIN2023: 3 view right wrist x-ray  - Severe degenerative changes within the STT joint.  No acute bony abnormalities.      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  X-rays from  were again personally reviewed in the office with the findings as demonstrated above by my interpretation.  After reviewing the risks/benefits, the patient elects to proceed with a repeat right STT joint injection under ultrasound guidance.  See procedure note below:    PROCEDURE:  Ultrasound Guided Injection of the  Right Rwfsmyuj-Hhtopxnaw-Fdelsionf joint      EQUIPMENT:  Ganesh Affiniti 70 with Linear [id number] (7-15MHz) Transducer (Hockey Stick).      PROCEDURAL PAUSE:    A procedural pause was conducted to verify:  correct patient identity, procedure to be performed, and as applicable, correct side, site, and correct patient position.      INFORMED CONSENT:   I discussed the risks, possible benefits, and alternatives to injection.  Following denial of allergy and review of potential side effects and complications (including but not necessarily limited to infection, allergic reaction, fat necrosis, skin depigmentation, local tissue breakdown, systemic effects of corticosteroids, elevation of blood glucose, injury to soft tissue and/or nerves, and seizure), all questions were answered, and consent was given to proceed.  Patient verbalized understanding.      PROCEDURE DETAILS:    The use of direct ultrasound visualization of the needle (rather than a non-guided ultrasound procedure) was required to increase patient safety by excluding inadvertent intramuscular or intratendinous placement and minimizing bleeding and injury by avoiding osteochondral and nearby neurovascular structures.  Guidance also maximizes accurate injection placement and likely clinical benefit beyond that obtained with a non-guided injection.  This allows increased diagnostic specificity when evaluating effectiveness of the injection.      Prior to the procedure, the right base of the thumb was examined with a 15MHz linear hockey stick transducer to determine the optimal needle path and visualize the radial aspect of the STT joint in a longitudinal plane relative to the metacarpals.  Following this, the skin was marked, and the base of the thumb was prepared with chlorhexidine.  Local anesthesia was obtained with 0.5mL of 1% lidocaine.  Then this area was re-examined using the same transducer, a sterile ultrasound transducer cover, sterile gloves, and  sterile gel.  Under ultrasound guidance, a 1.5-inch 25-gauge needle was advanced from distal to proximal taking an in-plane approach into the STT joint.  One needle pass was performed.  After ultrasound visualization of the needle tip in the target area and negative aspiration for blood, a mixture of 0.5mL of 1% lidocaine and 0.5mL of triamcinolone (40mg/mL) was injected into the right STT joint.  0mL was wasted.  Images have been saved on the musculoskeletal ultrasound in clinic.      The patient tolerated the procedure without complication and was discharged in good condition after a short observation period.  The patient was instructed to contact me regarding any questions pertaining to the procedure.      DIAGNOSIS:    -Successful ultrasound guided injection of the right wkobfsum-tdhkseuxl-jblwbthku joint without immediate complication      PLAN:   -Post-procedure care reviewed, including avoiding submersion of the injection site for 48 hours   -Return precautions reviewed for signs/symptoms that would be concerning for infection   -The patient was instructed to ice and take APAP this evening if needed  -Continue to wear the wrist brace as needed  -Return to clinic as needed      Josef Morales MD  1/11/2024  9:32 AM    Total time spent with this patient was 37 minutes which included chart review, visualization and independent interpretation of images, time spent with the patient, and documentation.    Procedure time:  24 minute(s)

## 2024-01-11 NOTE — NURSING NOTE
Patient is her today for ultrasound guided right thumb injection  Last injectio 5/9/23  last 5 months

## 2024-01-12 ENCOUNTER — MYC MEDICAL ADVICE (OUTPATIENT)
Dept: FAMILY MEDICINE | Facility: OTHER | Age: 64
End: 2024-01-12
Payer: OTHER GOVERNMENT

## 2024-01-12 DIAGNOSIS — G56.01 CARPAL TUNNEL SYNDROME OF RIGHT WRIST: ICD-10-CM

## 2024-01-12 DIAGNOSIS — M19.031 ARTHRITIS OF SCAPHOID-TRAPEZIUM-TRAPEZOID JOINT OF RIGHT HAND: Primary | ICD-10-CM

## 2024-02-01 ENCOUNTER — THERAPY VISIT (OUTPATIENT)
Dept: OCCUPATIONAL THERAPY | Facility: OTHER | Age: 64
End: 2024-02-01
Attending: FAMILY MEDICINE
Payer: OTHER GOVERNMENT

## 2024-02-01 DIAGNOSIS — G56.01 CARPAL TUNNEL SYNDROME OF RIGHT WRIST: ICD-10-CM

## 2024-02-01 DIAGNOSIS — M19.031 ARTHRITIS OF SCAPHOID-TRAPEZIUM-TRAPEZOID JOINT OF RIGHT HAND: ICD-10-CM

## 2024-02-01 PROCEDURE — 97110 THERAPEUTIC EXERCISES: CPT | Mod: GO

## 2024-02-01 PROCEDURE — 97165 OT EVAL LOW COMPLEX 30 MIN: CPT | Mod: GO

## 2024-02-01 PROCEDURE — 97035 APP MDLTY 1+ULTRASOUND EA 15: CPT | Mod: GO

## 2024-02-05 NOTE — PROGRESS NOTES
OCCUPATIONAL THERAPY EVALUATION  Type of Visit: Evaluation    See electronic medical record for Abuse and Falls Screening details.    Subjective      Presenting condition or subjective complaint: Address hand/wrist/thumb pain  Date of onset:      Relevant medical history:   See chart review for details   Dates & types of surgery:  NA    Prior diagnostic imaging/testing results: X-ray     Prior therapy history for the same diagnosis, illness or injury: No      Employment:   Owns a resort    Hobbies/Interests:  baking, gardening    Patient goals for therapy: Complete daily tasks without pain    Pain assessment: Pain present  Location: right hand /Rating: at best 2/10  at worst 8/10     Objective   ADDITIONAL HISTORY:  Right hand dominant  Patient reports symptoms of pain, stiffness/loss of motion, and weakness/loss of strength  Transportation: drives  Currently working in normal job with her own restrictions in place to prevent pain.    PAIN:  Pain Level at Rest: 2/10  Pain Level with Use: 8/10  Pain Location: hand and thumb  Pain Quality: Aching, Dull, and Tingling    POSTURE: Normal     EDEMA: Mild     SENSATION: Decreased Median Nerve distribution per pt report and While Sleeping     ROM: WFL     SPECIAL TESTS:  + Phalen's       STRENGTH:     Measured in pounds 2/5/2024 2/5/2024    Left Right   Trial 1 64 50   Trial 2 61 48   Trial 3 63 51   Average 63 50 mild pain with testing     Lateral Pinch  Measured in pounds 2/5/2024 2/5/2024    Left Right   Trial 1 10 10   Trial 2 12 11   Trial 3 11.5 10   Average 11.5 10     3 Point Pinch  Measured in pounds 2/5/2024 2/5/2024    Left Right   Trial 1 10 8   Trial 2 10 9   Trial 3 10 8   Average 10  8.5 pain with testing         Assessment & Plan   CLINICAL IMPRESSIONS  Medical Diagnosis: M19.031 (ICD-10-CM) - Arthritis of hhxgknit-ffarpeagh-onrqyrbvh joint of right hand  G56.01 (ICD-10-CM) - Carpal tunnel syndrome of right wrist    Treatment Diagnosis:       Impression/Assessment: Pt is a 63 year old female presenting to Occupational Therapy due to thumb pain and carpal tunnel symptoms.  The following significant findings have been identified: Impaired coordination, Impaired ROM, Impaired sensation, Impaired strength, and Pain.  These identified deficits interfere with their ability to perform self care tasks, work tasks, recreational activities, household chores,  yard work, and meal planning and preparation as compared to previous level of function.   Patient's limitations or Problem List includes: Pain, Decreased ROM/motion, Decreased , Tightness in musculature, and Adherence in connective tissue of the right wrist, hand, and thumb which interferes with the patient's ability to perform Self Care Tasks (dressing, bathing, hygiene/toileting), Work Tasks, Sleep Patterns, Recreational Activities, and Household Chores as compared to previous level of function.    Clinical Decision Making (Complexity):  Assessment of Occupational Performance: 1-3 Performance Deficits  Occupational Performance Limitations: dressing, care of pets, meal preparation and cleanup, shopping, sleep, and work  Clinical Decision Making (Complexity): Low complexity    PLAN OF CARE  Treatment Interventions:  Modalities:  US, Iontophoresis, and Paraffin  Therapeutic Exercise:  AROM, AAROM, PROM, Tendon Gliding, Blocking, Place and Hold, Contract Relax, Extensor Tracking, Isotonics, and Isometrics  Manual Techniques:  Coordination/Dexterity, Joint mobilization, Friction massage, Myofascial release, and Manual edema mobilization  Orthotics   :Self Care:  Self Care Tasks and Work Tasks    Long Term Goals   OT Goal 1  Goal Identifier: strength  Goal Description: Patient will increase R dominant  strength by at least 10# in order to improve ability to complete household chores and meal prep activities  Target Date: 03/12/24  OT Goal 2  Goal Identifier: pinch strength  Goal Description: Patient  will have inmproved pinch strength by at least 4# without pain  Rationale: In order to maximize safety and independence with performance of self-care activities  Target Date: 03/12/24  OT Goal 3  Goal Identifier: HEP  Goal Description: Patient will demonstrate knowledge and independence with HEP and pain management techniques  Rationale: In order to maximize safety and independence with ADL/IADLs  Target Date: 03/12/24      Frequency of Treatment: 1-2x week  Duration of Treatment: 4-6 weeks     Recommended Referrals to Other Professionals:  None  Education Assessment: Learner/Method: Patient;Listening;Demonstration  Education Comments: POC/HEP     Risks and benefits of evaluation/treatment have been explained.   Patient/Family/caregiver agrees with Plan of Care.     Evaluation Time:    OT Eval, Low Complexity Minutes (09276): 15    Signing Clinician: DANE Zavaleta [facility]                                                                                   OUTPATIENT OCCUPATIONAL THERAPY    PLAN OF TREATMENT FOR OUTPATIENT REHABILITATION   Patient's Last Name, First Name, KASHMIR YapCelio  E YOB: 1960   Provider's Name   [facility]   Medical Record No.  [id number]     Onset Date:  1/15/24 (referral date) Start of Care Date:  2/1/24     Medical Diagnosis:  M19.031 (ICD-10-CM) - Arthritis of bluvjkqe-pjxxgvxzx-rbffwoxrr joint of right hand  G56.01 (ICD-10-CM) - Carpal tunnel syndrome of right wrist      OT Treatment Diagnosis:    Plan of Treatment  Frequency/Duration:1-2x week/4-6 weeks    Certification date from  2/1/24   To   3/12/24    4% Dexamethasone with iontophoresis   See note above for plan of treatment details and functional goals     Juany Dobbs OT                         I CERTIFY THE NEED FOR THESE SERVICES FURNISHED UNDER        THIS PLAN OF TREATMENT AND WHILE UNDER MY CARE     (Physician attestation of  this document indicates review and certification of the therapy plan).              Referring Provider:  Josef Tinsley    Initial Assessment  See Epic Evaluation-

## 2024-02-06 ENCOUNTER — THERAPY VISIT (OUTPATIENT)
Dept: OCCUPATIONAL THERAPY | Facility: OTHER | Age: 64
End: 2024-02-06
Attending: FAMILY MEDICINE
Payer: OTHER GOVERNMENT

## 2024-02-06 DIAGNOSIS — G56.01 CARPAL TUNNEL SYNDROME OF RIGHT WRIST: ICD-10-CM

## 2024-02-06 DIAGNOSIS — M19.031 ARTHRITIS OF SCAPHOID-TRAPEZIUM-TRAPEZOID JOINT OF RIGHT HAND: Primary | ICD-10-CM

## 2024-02-06 PROCEDURE — 97110 THERAPEUTIC EXERCISES: CPT | Mod: GO

## 2024-02-06 PROCEDURE — 97035 APP MDLTY 1+ULTRASOUND EA 15: CPT | Mod: GO

## 2024-02-06 PROCEDURE — 97018 PARAFFIN BATH THERAPY: CPT | Mod: GO

## 2024-02-06 PROCEDURE — 97140 MANUAL THERAPY 1/> REGIONS: CPT | Mod: GO

## 2024-02-08 ENCOUNTER — OFFICE VISIT (OUTPATIENT)
Dept: FAMILY MEDICINE | Facility: OTHER | Age: 64
End: 2024-02-08
Attending: FAMILY MEDICINE
Payer: OTHER GOVERNMENT

## 2024-02-08 VITALS
DIASTOLIC BLOOD PRESSURE: 94 MMHG | TEMPERATURE: 99.3 F | HEIGHT: 66 IN | WEIGHT: 161 LBS | OXYGEN SATURATION: 96 % | HEART RATE: 84 BPM | RESPIRATION RATE: 18 BRPM | BODY MASS INDEX: 25.88 KG/M2 | SYSTOLIC BLOOD PRESSURE: 140 MMHG

## 2024-02-08 DIAGNOSIS — E78.5 DYSLIPIDEMIA: ICD-10-CM

## 2024-02-08 DIAGNOSIS — M19.041 DEGENERATIVE ARTHRITIS OF METACARPOPHALANGEAL JOINT OF RIGHT THUMB: ICD-10-CM

## 2024-02-08 DIAGNOSIS — Z00.00 ANNUAL PHYSICAL EXAM: Primary | ICD-10-CM

## 2024-02-08 DIAGNOSIS — B07.8 COMMON WART: ICD-10-CM

## 2024-02-08 PROCEDURE — 99396 PREV VISIT EST AGE 40-64: CPT | Mod: 25 | Performed by: FAMILY MEDICINE

## 2024-02-08 PROCEDURE — 17110 DESTRUCTION B9 LES UP TO 14: CPT | Performed by: FAMILY MEDICINE

## 2024-02-08 SDOH — HEALTH STABILITY: PHYSICAL HEALTH: ON AVERAGE, HOW MANY DAYS PER WEEK DO YOU ENGAGE IN MODERATE TO STRENUOUS EXERCISE (LIKE A BRISK WALK)?: 2 DAYS

## 2024-02-08 SDOH — HEALTH STABILITY: PHYSICAL HEALTH: ON AVERAGE, HOW MANY MINUTES DO YOU ENGAGE IN EXERCISE AT THIS LEVEL?: 40 MIN

## 2024-02-08 ASSESSMENT — SOCIAL DETERMINANTS OF HEALTH (SDOH): HOW OFTEN DO YOU GET TOGETHER WITH FRIENDS OR RELATIVES?: TWICE A WEEK

## 2024-02-08 ASSESSMENT — PAIN SCALES - GENERAL: PAINLEVEL: MILD PAIN (2)

## 2024-02-08 NOTE — NURSING NOTE
Patient is here for annual physical. Has a wart on finger.     No LMP recorded (approximate). Patient has had an ablation.  Medication Reconciliation: complete    Rika Her LPN 2/8/2024 1:39 PM       Advance care directive on file? yes  Advance care directive provided to patient? na       Rika Her LPN

## 2024-02-08 NOTE — PROGRESS NOTES
"Preventive Care Visit  [facility]  Sasha Mcconnell MD, Family Medicine  Feb 8, 2024    Assessment & Plan     Annual physical exam  Patient is counseled on importance of regular self breast exams and mammograms every 1-2 years starting at age 40. Patient will proceed with pap smears every 3-5 years until age 65. Discussed importance of calcium and vitamin D supplementation and osteoporosis screening. Immunizations are updated based on CDC recommendations and patients desire. Reviewed importance of sunscreen, limit sun exposure and monitoring for changing moles with ABCDEs. Recommend seatbelt use and helmets with biking, skiing and ATV/Snowmobile use.    Preventive cares are up-to-date.  Continue annual mammograms.  Patient will consider RSV vaccine.    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  Screen for rheumatoid factor and sed rate.  - Sedimentation Rate (ESR); Future  - Rheumatoid factor; Future    Dyslipidemia  Repeat lipid panel pending.  Reinforced importance of low-cholesterol diet.  - Lipid Panel; Future  - Comprehensive Metabolic Panel; Future    Common wart  Small common wart treated today with liquid nitrogen.  She will likely not need repeat treatment.  - DESTRUCT BENIGN LESION, UP TO 14    There are no Patient Instructions on file for this visit.    BMI  Estimated body mass index is 25.99 kg/m  as calculated from the following:    Height as of this encounter: 1.676 m (5' 6\").    Weight as of this encounter: 73 kg (161 lb).       Counseling  Appropriate preventive services were discussed with this patient, including applicable screening as appropriate for fall prevention, nutrition, physical activity, Tobacco-use cessation, weight loss and cognition.  Checklist reviewing preventive services available has been given to the patient.  Reviewed patient's diet, addressing concerns and/or questions.   She is at risk " for lack of exercise and has been provided with information to increase physical activity for the benefit of her well-being.       No follow-ups on file.    Jackie Pickens is a 63 year old, presenting for the following:  Physical    62 yo female presents for annual exam    No chronic medications.    Right pointer finger wart.  Has tried over-the-counter Compound W.    Right MCP arthritis, s/p steroid injection x 2 and OT      Health Care Directive  Patient does not have a Health Care Directive or Living Will: Discussed advance care planning with patient; however, patient declined at this time.            2/8/2024   General Health   How would you rate your overall physical health? Good   Feel stress (tense, anxious, or unable to sleep) Not at all         2/8/2024   Nutrition   Three or more servings of calcium each day? Yes   Diet: Regular (no restrictions)   How many servings of fruit and vegetables per day? (!) 2-3   How many sweetened beverages each day? 0-1         2/8/2024   Exercise   Days per week of moderate/strenous exercise 2 days   Average minutes spent exercising at this level 40 min   (!) EXERCISE CONCERN      2/8/2024   Social Factors   Frequency of gathering with friends or relatives Twice a week   Worry food won't last until get money to buy more No   Food not last or not have enough money for food? No   Do you have housing?  Yes   Are you worried about losing your housing? No   Lack of transportation? No   Unable to get utilities (heat,electricity)? No         2/8/2024   Fall Risk   Fallen 2 or more times in the past year? No   Trouble with walking or balance? No          2/8/2024   Dental   Dentist two times every year? Yes         2/8/2024   TB Screening   Were you born outside of [place]?  No         Today's PHQ-2 Score:       2/8/2024     1:31 PM   PHQ-2 ( 1999 [employer])   Q1: Little interest or pleasure in doing things 0   Q2: Feeling down, depressed or hopeless 0   PHQ-2 Score 0   Q1: Little  interest or pleasure in doing things Not at all   Q2: Feeling down, depressed or hopeless Not at all   PHQ-2 Score 0           2/8/2024   Substance Use   Alcohol more than 3/day or more than 7/wk No   Do you use any other substances recreationally? (!) ALCOHOL     Social History     Tobacco Use    Smoking status: Never     Passive exposure: Never    Smokeless tobacco: Never   Vaping Use    Vaping Use: Never used   Substance Use Topics    Alcohol use: Yes     Alcohol/week: 0.0 standard drinks of alcohol     Comment: Alcoholic Drinks/day: one glass of wine per lucila    Drug use: No     Comment: Drug use: No             2/8/2024   Breast Cancer Screening   Family history of breast, colon, or ovarian cancer? No / Unknown          No data to display                 Mammogram Screening - Mammogram every 1-2 years updated in Health Maintenance based on mutual decision making        2/8/2024   STI Screening   New sexual partner(s) since last STI/HIV test? No     History of abnormal Pap smear: NO - age 30-65 PAP every 5 years with negative HPV co-testing recommended        Latest Ref Rng & Units 7/19/2022     2:54 PM   PAP / HPV   PAP  Negative for Intraepithelial Lesion or Malignancy (NILM)    HPV 16 DNA Negative Negative    HPV 18 DNA Negative Negative    Other HR HPV Negative Negative      The 10-year ASCVD risk score (Delbert DK, et al., 2019) is: 6.6%    Values used to calculate the score:      Age: 63 years      Sex: Female      Is Non- : No      Diabetic: No      Tobacco smoker: No      Systolic Blood Pressure: 142 mmHg      Is BP treated: No      HDL Cholesterol: 56 mg/dL      Total Cholesterol: 235 mg/dL           Reviewed and updated as needed this visit by Provider                    Past Medical History:   Diagnosis Date    Asymptomatic menopausal state     No Comments Provided     Past Surgical History:   Procedure Laterality Date    COLONOSCOPY N/A 11/09/2022    serrated adenomas, f/u 3  "years    ENDOSCOPIC STRIPPING VEIN(S)      Varicose vein laser procedures    LAPAROSCOPIC ABLATION ENDOMETRIOSIS      2008, Endometrial ablation    OTHER SURGICAL HISTORY      99887.0,XR BREAST STEREOTACTIC WIRE LOCAL ([place])    OTHER SURGICAL HISTORY      [id number],LUMBAR DISKECTOMY     Review of Systems    Review of Systems  Constitutional, HEENT, cardiovascular, pulmonary, gi and gu systems are negative, except as otherwise noted.     Objective    Exam  BP (!) 142/92   Pulse 84   Temp 99.3  F (37.4  C) (Tympanic)   Resp 18   Ht 1.676 m (5' 6\")   Wt 73 kg (161 lb)   LMP  (Approximate)   SpO2 96%   Breastfeeding No   BMI 25.99 kg/m     Estimated body mass index is 25.99 kg/m  as calculated from the following:    Height as of this encounter: 1.676 m (5' 6\").    Weight as of this encounter: 73 kg (161 lb).    Physical Exam  GENERAL: alert and no distress  HENT: ear canals and TM's normal, nose and mouth without ulcers or lesions  NECK: no adenopathy, no asymmetry, masses, or scars  RESP: lungs clear to auscultation - no rales, rhonchi or wheezes  CV: regular rate and rhythm, normal S1 S2, no S3 or S4, no murmur, click or rub, no peripheral edema  ABDOMEN: soft, nontender, no hepatosplenomegaly, no masses and bowel sounds normal  SKIN: Small common wart on fiunger pared down to the base and treated with liquid nitrogen and 3 freeze thaw cycles.  NEURO: Normal strength and tone, mentation intact and speech normal  PSYCH: mentation appears normal, affect normal/bright      Signed Electronically by: Sasha Mcconnell MD    "

## 2024-03-04 ENCOUNTER — THERAPY VISIT (OUTPATIENT)
Dept: OCCUPATIONAL THERAPY | Facility: OTHER | Age: 64
End: 2024-03-04
Attending: FAMILY MEDICINE
Payer: OTHER GOVERNMENT

## 2024-03-04 DIAGNOSIS — G56.01 CARPAL TUNNEL SYNDROME OF RIGHT WRIST: ICD-10-CM

## 2024-03-04 DIAGNOSIS — M19.031 ARTHRITIS OF SCAPHOID-TRAPEZIUM-TRAPEZOID JOINT OF RIGHT HAND: Primary | ICD-10-CM

## 2024-03-04 PROCEDURE — 97035 APP MDLTY 1+ULTRASOUND EA 15: CPT | Mod: GO

## 2024-03-04 PROCEDURE — 97140 MANUAL THERAPY 1/> REGIONS: CPT | Mod: GO

## 2024-03-04 PROCEDURE — 97110 THERAPEUTIC EXERCISES: CPT | Mod: GO

## 2024-03-11 ENCOUNTER — THERAPY VISIT (OUTPATIENT)
Dept: OCCUPATIONAL THERAPY | Facility: OTHER | Age: 64
End: 2024-03-11
Attending: FAMILY MEDICINE
Payer: OTHER GOVERNMENT

## 2024-03-11 DIAGNOSIS — G56.01 CARPAL TUNNEL SYNDROME OF RIGHT WRIST: ICD-10-CM

## 2024-03-11 DIAGNOSIS — M19.031 ARTHRITIS OF SCAPHOID-TRAPEZIUM-TRAPEZOID JOINT OF RIGHT HAND: Primary | ICD-10-CM

## 2024-03-11 PROCEDURE — 97110 THERAPEUTIC EXERCISES: CPT | Mod: GO

## 2024-03-11 PROCEDURE — 97035 APP MDLTY 1+ULTRASOUND EA 15: CPT | Mod: GO

## 2024-03-26 ENCOUNTER — OFFICE VISIT (OUTPATIENT)
Dept: FAMILY MEDICINE | Facility: OTHER | Age: 64
End: 2024-03-26
Attending: NURSE PRACTITIONER
Payer: OTHER GOVERNMENT

## 2024-03-26 ENCOUNTER — HOSPITAL ENCOUNTER (OUTPATIENT)
Dept: GENERAL RADIOLOGY | Facility: OTHER | Age: 64
Discharge: HOME | End: 2024-03-26
Attending: NURSE PRACTITIONER
Payer: OTHER GOVERNMENT

## 2024-03-26 DIAGNOSIS — K59.00 CONSTIPATION, UNSPECIFIED CONSTIPATION TYPE: ICD-10-CM

## 2024-03-26 DIAGNOSIS — B07.9 VIRAL WARTS, UNSPECIFIED TYPE: ICD-10-CM

## 2024-03-26 DIAGNOSIS — R19.4 CHANGE IN BOWEL HABITS: ICD-10-CM

## 2024-03-26 DIAGNOSIS — R19.4 CHANGE IN BOWEL HABITS: Primary | ICD-10-CM

## 2024-03-26 PROCEDURE — 17110 DESTRUCTION B9 LES UP TO 14: CPT | Performed by: NURSE PRACTITIONER

## 2024-03-26 PROCEDURE — 74019 RADEX ABDOMEN 2 VIEWS: CPT

## 2024-03-26 PROCEDURE — 99213 OFFICE O/P EST LOW 20 MIN: CPT | Mod: 25 | Performed by: NURSE PRACTITIONER

## 2024-03-26 RX ORDER — SENNOSIDES A AND B 8.6 MG/1
2 TABLET, FILM COATED ORAL AT BEDTIME
COMMUNITY

## 2024-03-26 ASSESSMENT — PAIN SCALES - GENERAL: PAINLEVEL: NO PAIN (0)

## 2024-03-26 NOTE — PROGRESS NOTES
Assessment & Plan   Problem List Items Addressed This Visit    None  Visit Diagnoses       Change in bowel habits    -  Primary    Relevant Orders    XR Abdomen 2 Views        Abdominal x-ray updated today, moderate stool burden appreciated.  I do not see any signs of impaction or blockage.  Discussed bowel cleanout with options such as GoLytely colon prep, MiraLAX several times over the next 24 hours or increased dose over the next 3 days.  She plans to do a bowel cleanout after Easter, sooner if she is having worsening concerns.    Single wart to right index finger was frozen with liquid nitrogen x 3, she only tolerated 2 to 3 seconds with each pass.  Follow-up as needed.    Review of the result(s) of each unique test - xray  Ordering of each unique test       No follow-ups on file.      Jackie Pickens is a 63 year old, presenting for the following health issues:  Bowel Problems    History of Present Illness       Reason for visit:  Change in bowel movements and nausea  Symptom onset:  More than a month    She eats 2-3 servings of fruits and vegetables daily.She consumes 0 sweetened beverage(s) daily.She exercises with enough effort to increase her heart rate 9 or less minutes per day.  She exercises with enough effort to increase her heart rate 3 or less days per week.   She is taking medications regularly.     She comes to the clinic today for bowel concerns.  She reports in February she developed a cough, had a harsh cough for approximately 2 weeks.  Couple days after cough seems to subside, she noted that she had not had a bowel movement in several days.  She started taking senna twice daily for 1 week and then reduce this to once a day.  If she takes the Senokot, she will have a bowel movement within 8 to 10 hours.  Has cramping and bowel movements are small in diameter and short.  She is not having any other abdominal pain.  She is not having any diarrhea.  Does report a lot of gas.  She is eating  "and drinking well, has increased fiber and water intake.  She has had some low-grade nausea for the past several weeks.  No history of bowel issues.  Colonoscopy in 2022 with polyps, plan to recheck in 3 years.    She also wart on her right index finger, this was frozen with her primary care provider previously but returned.    Review of Systems  See above      Objective    BP (!) 158/96   Pulse 92   Temp 98.5  F (36.9  C)   Resp 18   Ht 1.676 m (5' 6\")   Wt 74.8 kg (164 lb 12.8 oz)   SpO2 98%   BMI 26.60 kg/m    Body mass index is 26.6 kg/m .  Physical Exam   GENERAL: alert and no distress  RESP: lungs clear to auscultation - no rales, rhonchi or wheezes  CV: regular rate and rhythm, normal S1 S2  ABDOMEN: Soft, mild firmness to epigastric region, tender to epigastric region and left lower quadrant with palpation, no hepatosplenomegaly, no masses and bowel sounds diminished  PSYCH: mentation appears normal, affect normal/bright  DERM: Single wart on right index finger, frozen with liquid nitrogen x 3.          Signed Electronically by: JANETTE Garza CNP    "

## 2024-03-26 NOTE — PATIENT INSTRUCTIONS
**Daily Routine  1) Sit on the toilet for 5-10 min, 2-3 times a day.      Diet  1) Drink lots of liquids: goal at least  oz of liquids a day (no more than 2-3 glasses of milk, no more than 1 glass of juice or other sugary beverages, the rest should be water).  2) Focus on having at least a fruit and vegetable serving at every meal.  Please choose whole grain breads, pastas, and cereals.  Beans, lentils, and nuts are also be good sources of fiber.        Bowel Clean-out  The clean-out will help to get extra stool burden out of the intestines and make it easier to stool and not get backed up again.  At the end of the clean-out, the stools should be completely liquid, see through, and without chunks.     1)   Over 3 days:  Miralax 1 cap in 8oz clear liquid, three times a day for 3 days.  OR   Over 1 day: Miralax 14 caps in 64 oz of clear liquids.  Allow to sit in fridge for 30-60 minutes to allow the Miralax to fully dissolve.  Then drink 1 glass every 15-30 minutes over the next 3-4 hours.     2) Senna 8.8 mg (1/2 to 1 full ex lax, or 1-2 senna pills) each evening during the cleanout.     3) During the cleanout, only drink clear liquids (juice, broth, jello, popsicles); this will help make the cleanout more effective.       Daily Medication  Start the day after you finish your cleanout.  1) Miralax 1 cap a day mixed in 8 oz of clear liquid.    We may need to adjust the dose of Miralax (up or down by 1/2 cap at a time every few days) for a goal of 1-2 medium to large, soft (pudding or mashed potato like consistency) stools a day.   If only taking once a day, some families add in an extra dose before bedtime.     2) Senna 8.8 mg per day (1/2 to 1 full ex lax, or 1-2 senna pills) at bedtime to help increase intestinal squeeze and movement.  This allows us to get more stool out with each bowel movement.  People can become dependent on senna over time, but in the short-term it is safe.  Children may notice some  intestinal cramping, but this is usually mild.

## 2024-03-26 NOTE — NURSING NOTE
Patient presents today for bowel concerns. Patient reports she has been having to take a stool softer which she has never had to. Patient has been having more gas and nausea.    Medication Reconciliation Complete    Tori Hutchison LPN  3/26/2024 10:09 AM

## 2024-03-28 VITALS
SYSTOLIC BLOOD PRESSURE: 134 MMHG | RESPIRATION RATE: 18 BRPM | HEART RATE: 92 BPM | DIASTOLIC BLOOD PRESSURE: 76 MMHG | WEIGHT: 164.8 LBS | BODY MASS INDEX: 26.48 KG/M2 | TEMPERATURE: 98.5 F | HEIGHT: 66 IN | OXYGEN SATURATION: 98 %

## 2024-07-11 ENCOUNTER — OFFICE VISIT (OUTPATIENT)
Dept: FAMILY MEDICINE | Facility: OTHER | Age: 64
End: 2024-07-11
Attending: FAMILY MEDICINE
Payer: OTHER GOVERNMENT

## 2024-07-11 VITALS
BODY MASS INDEX: 25.18 KG/M2 | HEART RATE: 68 BPM | SYSTOLIC BLOOD PRESSURE: 120 MMHG | WEIGHT: 156 LBS | RESPIRATION RATE: 18 BRPM | DIASTOLIC BLOOD PRESSURE: 74 MMHG | TEMPERATURE: 97.5 F

## 2024-07-11 DIAGNOSIS — G56.01 CARPAL TUNNEL SYNDROME OF RIGHT WRIST: ICD-10-CM

## 2024-07-11 DIAGNOSIS — M19.032 ARTHRITIS OF SCAPHOID-TRAPEZIUM-TRAPEZOID JOINT OF LEFT HAND: ICD-10-CM

## 2024-07-11 DIAGNOSIS — M19.031 ARTHRITIS OF SCAPHOID-TRAPEZIUM-TRAPEZOID JOINT OF RIGHT HAND: Primary | ICD-10-CM

## 2024-07-11 PROCEDURE — 250N000011 HC RX IP 250 OP 636: Performed by: FAMILY MEDICINE

## 2024-07-11 PROCEDURE — 99214 OFFICE O/P EST MOD 30 MIN: CPT | Mod: 25 | Performed by: FAMILY MEDICINE

## 2024-07-11 PROCEDURE — 250N000009 HC RX 250: Mod: JW | Performed by: FAMILY MEDICINE

## 2024-07-11 PROCEDURE — 20604 DRAIN/INJ JOINT/BURSA W/US: CPT | Mod: RT | Performed by: FAMILY MEDICINE

## 2024-07-11 RX ORDER — TRIAMCINOLONE ACETONIDE 40 MG/ML
20 INJECTION, SUSPENSION INTRA-ARTICULAR; INTRAMUSCULAR ONCE
Status: COMPLETED | OUTPATIENT
Start: 2024-07-11 | End: 2024-07-11

## 2024-07-11 RX ORDER — LIDOCAINE HYDROCHLORIDE 10 MG/ML
0.5 INJECTION, SOLUTION EPIDURAL; INFILTRATION; INTRACAUDAL; PERINEURAL ONCE
Status: COMPLETED | OUTPATIENT
Start: 2024-07-11 | End: 2024-07-11

## 2024-07-11 RX ADMIN — LIDOCAINE HYDROCHLORIDE 0.5 ML: 10 INJECTION, SOLUTION EPIDURAL; INFILTRATION; INTRACAUDAL; PERINEURAL at 09:02

## 2024-07-11 RX ADMIN — LIDOCAINE HYDROCHLORIDE 0.5 ML: 10 INJECTION, SOLUTION EPIDURAL; INFILTRATION; INTRACAUDAL; PERINEURAL at 09:01

## 2024-07-11 RX ADMIN — TRIAMCINOLONE ACETONIDE 20 MG: 40 INJECTION, SUSPENSION INTRA-ARTICULAR; INTRAMUSCULAR at 09:02

## 2024-07-11 ASSESSMENT — PAIN SCALES - GENERAL: PAINLEVEL: MODERATE PAIN (5)

## 2024-07-11 NOTE — PROGRESS NOTES
Sports Medicine Office Note    HPI:  63-year-old female coming in for follow-up evaluation of right wrist pain.  Her pain has been present since summer 2022 without inciting event or injury.  She was seen in this office in May 2023 and received a corticosteroid injection into the STT joint under ultrasound guidance.  This procedure was repeated in January 2024.  She has achieved several months of symptom improvement with these previous injections.  She comes in today endorsing 9/10 pain.  She characterized the pain as dull, stabbing, aching, throbbing, and burning.  She is using ibuprofen and topical medications to help with her symptoms.  She is now starting to feel some symptoms develop on the left side.  New within the last couple months is numbness extending into the thumb.  This occurs with driving and wearing a wrist brace.  She does not feel like the symptoms have been there in the past.      EXAM:  /74 (BP Location: Right arm, Patient Position: Sitting, Cuff Size: Adult Regular)   Pulse 68   Temp 97.5  F (36.4  C) (Tympanic)   Resp 18   Wt 70.8 kg (156 lb)   BMI 25.18 kg/m    MUSCULOSKELETAL EXAM:  RIGHT WRIST  Inspection:  -No gross deformity  -No bruising or swelling  -Scars:  None    Tenderness to palpation of the:  -Medial epicondyle:  Negative  -Lateral epicondyle:  Negative  -Radial head:  Negative  -Radial shaft:  Negative  -Ulnar shaft:  Negative  -Forearm flexors and extensors:  Negative  -Ulnar styloid:  Negative  -Distal radius:  Negative  -Germaine's tubercle:  Negative  -Scapholunate ligament:  Negative  -Scaphoid in anatomical snuffbox: Positive  -1st CMC joint: Positive  -1st MCP joint:  Negative  -Metacarpals:  Negative    Sensation:  -Intact to light touch in the radial, median, and ulnar nerve distributions    Motor:  -Intact AIN, PIN, and IO    Special Tests:  -Phalen test: Positive  -Tinel test:  Negative  -Durkan test: Positive  -Finkelstein test:  Negative  -1st CMC grind test:  Positive  -TFCC grind test: Weakly positive    Other:  -No signs of cyanosis. Normal skin temperature of the upper extremity.  -Elbow:  No gross deformity. Full range of motion.  -Left wrist:  No gross deformity. No palpable tenderness. Normal strength and ROM.      IMAGIN2023: 3 view right wrist x-ray  - Severe degenerative changes within the STT joint.  No acute bony abnormalities.      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  X-rays from 2023 were again personally reviewed in the office with the findings as demonstrated above by my interpretation.  After reviewing the risks/benefits, the patient like to proceed with a repeat right STT joint injection under ultrasound guidance.  See procedure note below:    PROCEDURE:  Ultrasound Guided Injection of the Right Vpcvrunn-Yd[place] joint      EQUIPMENT:  Ganesh Affiniti 70 with Linear L15-7io (7-15MHz) Transducer (Hockey Stick).      PROCEDURAL PAUSE:    A procedural pause was conducted to verify:  correct patient identity, procedure to be performed, and as applicable, correct side, site, and correct patient position.      INFORMED CONSENT:   I discussed the risks, possible benefits, and alternatives to injection.  Following denial of allergy and review of potential side effects and complications (including but not necessarily limited to infection, allergic  reaction, fat necrosis, skin depigmentation, local tissue breakdown, systemic effects of corticosteroids, elevation of blood glucose, injury to soft tissue and/or nerves, and seizure), all questions were answered, and consent was given to proceed.  Patient verbalized understanding.      PROCEDURE DETAILS:    The use of direct ultrasound visualization of the needle (rather than a non-guided ultrasound procedure) was required to increase patient safety by excluding inadvertent intramuscular or intratendinous placement and minimizing bleeding and injury by avoiding osteochondral and nearby neurovascular structures.  Guidance also maximizes accurate injection placement and likely clinical benefit beyond that obtained with a non-guided injection.  This allows increased diagnostic specificity when evaluating effectiveness of the injection.      Prior to the procedure, the right base of the thumb was examined with a 15MHz linear hockey stick transducer to determine the optimal needle path and visualize the radial aspect of the STT joint in a longitudinal plane relative to the metacarpals.  Following this, the skin was marked, and the base of the thumb was prepared with chlorhexidine.  Local anesthesia was obtained with 0.5mL of 1% lidocaine.  Then this area was re-examined using the same transducer, a sterile ultrasound transducer cover, sterile gloves, and sterile gel.  Under ultrasound guidance, a 1.5-inch 25-gauge needle was advanced from proximal to distal taking an in-plane approach into the STT joint.  One needle pass was performed.  After ultrasound visualization of the needle tip in the target area and negative aspiration for blood, a mixture of 0.5mL of 1% lidocaine and 0.5mL of triamcinolone (40mg/mL) was injected into the right STT joint.  0mL was wasted.  Images have been saved on the musculoskeletal ultrasound in clinic.      The patient tolerated the procedure without complication and was discharged in good  condition after a short observation period.  The patient was instructed to contact me regarding any questions pertaining to the procedure.      DIAGNOSIS:    -Successful ultrasound guided injection of the right jhbqnvwv-wbfivrwnu-lsqebwkgw joint without immediate complication      PLAN:   -Post-procedure care reviewed, including avoiding submersion of the injection site for 48 hours   -Return precautions reviewed for signs/symptoms that would be concerning for infection   -The patient was instructed to ice and take APAP this evening if needed  -Continue to wear the wrist brace as needed  -Return to clinic as needed    Left wrist pain:  Likely similar findings as the right wrist, suspect STT arthritis, possibly for CMC arthritis.  Discussed treatment options to include expectant management, ice, topical medications, oral medications, and injections.  At this time the patient does not feel that symptoms are bothersome enough to warrant more aggressive interventions.  - Monitor symptoms  - Could consider STT injection in the future, would need radiographs prior to procedure    Right carpal tunnel syndrome:  Symptoms are intermittent and mild.  Treatment options include expectant management, bracing, home exercises, OTC analgesics.  - Patient fit for a right neutral wrist brace  - Handout given with home exercises for carpal tunnel syndrome  - Monitor for symptom improvement with today's injection which may provide some benefit due to proximity  - Follow-up as needed      Josef Morales MD  7/11/2024  8:02 AM    Total time spent with this patient was 53 minutes which included chart review, visualization and independent interpretation of images, time spent with the patient, and documentation.    Procedure time:  28 minute(s)

## 2024-07-11 NOTE — NURSING NOTE
"Patient presents to the clinic for right hand injection with concerns about left hand pain.    FOOD SECURITY SCREENING QUESTIONS:    The next two questions are to help us understand your food security.  If you are feeling you need any assistance in this area, we have resources available to support you today.    Hunger Vital Signs:  Within the past 12 months we worried whether our food would run out before we got money to buy more. Never  Within the past 12 months the food we bought just didn't last and we didn't have money to get more. Never    Advance Care Directive on file? no  Advance Care Directive provided to patient? Declined.      Chief Complaint   Patient presents with    Musculoskeletal Problem       Initial /74 (BP Location: Right arm, Patient Position: Sitting, Cuff Size: Adult Regular)   Pulse 68   Temp 97.5  F (36.4  C) (Tympanic)   Resp 18   Wt 70.8 kg (156 lb)   BMI 25.18 kg/m   Estimated body mass index is 25.18 kg/m  as calculated from the following:    Height as of 3/26/24: 1.676 m (5' 6\").    Weight as of this encounter: 70.8 kg (156 lb).  Medication Reconciliation: complete        Kay Nettles LPN     "

## 2025-05-17 ENCOUNTER — HEALTH MAINTENANCE LETTER (OUTPATIENT)
Age: 65
End: 2025-05-17

## (undated) DEVICE — ENDO BRUSH CHANNEL MASTER CLEANING 2-4.2MM BW-412T

## (undated) DEVICE — SUCTION MANIFOLD NEPTUNE 2 SYS 4 PORT 0702-020-000

## (undated) DEVICE — TUBING SUCTION 10'X3/16" N510

## (undated) DEVICE — ENDO FORCEP ENDOJAW BIOPSY 2.8MMX230CM FB-220U

## (undated) DEVICE — SOL WATER 1500ML

## (undated) DEVICE — ENDO KIT COMPLIANCE DYKENDOCMPLY

## (undated) DEVICE — ENDO SNARE POLYPECTOMY OVAL 10MM LOOP SD-240U-10

## (undated) RX ORDER — PROPOFOL 10 MG/ML
INJECTION, EMULSION INTRAVENOUS
Status: DISPENSED
Start: 2022-11-09

## (undated) RX ORDER — TRIAMCINOLONE ACETONIDE 40 MG/ML
INJECTION, SUSPENSION INTRA-ARTICULAR; INTRAMUSCULAR
Status: DISPENSED
Start: 2024-01-11

## (undated) RX ORDER — LIDOCAINE HYDROCHLORIDE 10 MG/ML
INJECTION, SOLUTION INFILTRATION; PERINEURAL
Status: DISPENSED
Start: 2024-01-11

## (undated) RX ORDER — LIDOCAINE HYDROCHLORIDE 20 MG/ML
INJECTION, SOLUTION EPIDURAL; INFILTRATION; INTRACAUDAL; PERINEURAL
Status: DISPENSED
Start: 2022-11-09